# Patient Record
Sex: FEMALE | Race: WHITE | NOT HISPANIC OR LATINO | URBAN - NONMETROPOLITAN AREA
[De-identification: names, ages, dates, MRNs, and addresses within clinical notes are randomized per-mention and may not be internally consistent; named-entity substitution may affect disease eponyms.]

---

## 2018-12-13 ENCOUNTER — OPTICAL OFFICE (OUTPATIENT)
Dept: URBAN - NONMETROPOLITAN AREA CLINIC 4 | Facility: CLINIC | Age: 38
Setting detail: OPHTHALMOLOGY
End: 2018-12-13

## 2018-12-13 ENCOUNTER — RX ONLY (RX ONLY)
Age: 38
End: 2018-12-13

## 2018-12-13 ENCOUNTER — DOCTOR'S OFFICE (OUTPATIENT)
Dept: URBAN - NONMETROPOLITAN AREA CLINIC 1 | Facility: CLINIC | Age: 38
Setting detail: OPHTHALMOLOGY
End: 2018-12-13
Payer: COMMERCIAL

## 2018-12-13 DIAGNOSIS — Z01.00: ICD-10-CM

## 2018-12-13 DIAGNOSIS — H52.13: ICD-10-CM

## 2018-12-13 PROCEDURE — V2020 VISION SVCS FRAMES PURCHASES: HCPCS | Performed by: OPTOMETRIST

## 2018-12-13 PROCEDURE — 92310 CONTACT LENS FITTING OU: CPT | Performed by: OPTOMETRIST

## 2018-12-13 PROCEDURE — V2103 SPHEROCYLINDR 4.00D/12-2.00D: HCPCS | Performed by: OPTOMETRIST

## 2018-12-13 PROCEDURE — 92004 COMPRE OPH EXAM NEW PT 1/>: CPT | Performed by: OPTOMETRIST

## 2018-12-13 PROCEDURE — V2100 LENS SPHER SINGLE PLANO 4.00: HCPCS | Performed by: OPTOMETRIST

## 2018-12-13 ASSESSMENT — VISUAL ACUITY
OD_BCVA: 20/25
OS_BCVA: 20/25

## 2018-12-13 ASSESSMENT — REFRACTION_MANIFEST
OD_AXIS: 170
OS_CYLINDER: SPH
OD_VA1: 20/20
OD_VA2: 20/
OD_VA3: 20/
OU_VA: 20/20
OS_VA1: 20/20
OD_CYLINDER: -0.25
OS_VA3: 20/
OS_VA3: 20/
OU_VA: 20/
OD_VA3: 20/
OS_VA1: 20/
OS_VA2: 20/
OS_VA2: 20/
OS_SPHERE: -2.00
OD_VA2: 20/
OD_SPHERE: -1.00
OD_VA1: 20/

## 2018-12-13 ASSESSMENT — SPHEQUIV_DERIVED
OD_SPHEQUIV: -1.125
OD_SPHEQUIV: -1.125

## 2018-12-13 ASSESSMENT — REFRACTION_AUTOREFRACTION
OD_CYLINDER: -0.25
OD_SPHERE: -1.00
OS_CYLINDER: SPH
OS_SPHERE: -2.00
OD_AXIS: 017

## 2018-12-13 ASSESSMENT — REFRACTION_CURRENTRX
OS_SPHERE: -1.75
OS_OVR_VA: 20/
OD_OVR_VA: 20/
OS_OVR_VA: 20/
OD_OVR_VA: 20/
OD_OVR_VA: 20/
OD_SPHERE: -1.00
OS_OVR_VA: 20/

## 2018-12-13 ASSESSMENT — CONFRONTATIONAL VISUAL FIELD TEST (CVF)
OD_FINDINGS: FULL
OS_FINDINGS: FULL

## 2019-01-25 ENCOUNTER — OPTICAL OFFICE (OUTPATIENT)
Dept: URBAN - NONMETROPOLITAN AREA CLINIC 4 | Facility: CLINIC | Age: 39
Setting detail: OPHTHALMOLOGY
End: 2019-01-25
Payer: COMMERCIAL

## 2019-01-25 DIAGNOSIS — H52.13: ICD-10-CM

## 2019-01-25 PROCEDURE — S0500 DISPOS CONT LENS: HCPCS | Performed by: OPTOMETRIST

## 2022-07-03 ENCOUNTER — APPOINTMENT (EMERGENCY)
Dept: CT IMAGING | Facility: HOSPITAL | Age: 42
End: 2022-07-03
Payer: COMMERCIAL

## 2022-07-03 ENCOUNTER — HOSPITAL ENCOUNTER (EMERGENCY)
Facility: HOSPITAL | Age: 42
Discharge: HOME | End: 2022-07-03
Attending: STUDENT IN AN ORGANIZED HEALTH CARE EDUCATION/TRAINING PROGRAM | Admitting: STUDENT IN AN ORGANIZED HEALTH CARE EDUCATION/TRAINING PROGRAM
Payer: COMMERCIAL

## 2022-07-03 VITALS
OXYGEN SATURATION: 98 % | RESPIRATION RATE: 16 BRPM | HEIGHT: 62 IN | WEIGHT: 205 LBS | HEART RATE: 74 BPM | BODY MASS INDEX: 37.73 KG/M2 | SYSTOLIC BLOOD PRESSURE: 126 MMHG | TEMPERATURE: 98 F | DIASTOLIC BLOOD PRESSURE: 68 MMHG

## 2022-07-03 DIAGNOSIS — R10.9 RIGHT SIDED ABDOMINAL PAIN: Primary | ICD-10-CM

## 2022-07-03 LAB
ALBUMIN SERPL BCP-MCNC: 3.8 G/DL (ref 3.5–5)
ALP SERPL-CCNC: 74 U/L (ref 46–116)
ALT SERPL W P-5'-P-CCNC: 37 U/L (ref 12–78)
ANION GAP SERPL CALCULATED.3IONS-SCNC: 8 MMOL/L (ref 4–13)
AST SERPL W P-5'-P-CCNC: 21 U/L (ref 5–45)
BACTERIA UR QL AUTO: ABNORMAL /HPF
BASOPHILS # BLD AUTO: 0.05 THOUSANDS/ΜL (ref 0–0.1)
BASOPHILS NFR BLD AUTO: 1 % (ref 0–1)
BILIRUB SERPL-MCNC: 0.37 MG/DL (ref 0.2–1)
BILIRUB UR QL STRIP: NEGATIVE
BUN SERPL-MCNC: 11 MG/DL (ref 5–25)
CALCIUM SERPL-MCNC: 8.8 MG/DL (ref 8.3–10.1)
CHLORIDE SERPL-SCNC: 104 MMOL/L (ref 100–108)
CLARITY UR: CLEAR
CO2 SERPL-SCNC: 26 MMOL/L (ref 21–32)
COLOR UR: YELLOW
CREAT SERPL-MCNC: 0.73 MG/DL (ref 0.6–1.3)
EOSINOPHIL # BLD AUTO: 0.24 THOUSAND/ΜL (ref 0–0.61)
EOSINOPHIL NFR BLD AUTO: 3 % (ref 0–6)
ERYTHROCYTE [DISTWIDTH] IN BLOOD BY AUTOMATED COUNT: 12.4 % (ref 11.6–15.1)
EXT PREG TEST URINE: NEGATIVE
EXT. CONTROL ED NAV: NORMAL
GFR SERPL CREATININE-BSD FRML MDRD: 100 ML/MIN/1.73SQ M
GLUCOSE SERPL-MCNC: 93 MG/DL (ref 65–140)
GLUCOSE UR STRIP-MCNC: NEGATIVE MG/DL
HCT VFR BLD AUTO: 41.2 % (ref 34.8–46.1)
HGB BLD-MCNC: 13.7 G/DL (ref 11.5–15.4)
HGB UR QL STRIP.AUTO: ABNORMAL
IMM GRANULOCYTES # BLD AUTO: 0.03 THOUSAND/UL (ref 0–0.2)
IMM GRANULOCYTES NFR BLD AUTO: 0 % (ref 0–2)
KETONES UR STRIP-MCNC: ABNORMAL MG/DL
LEUKOCYTE ESTERASE UR QL STRIP: NEGATIVE
LIPASE SERPL-CCNC: 135 U/L (ref 73–393)
LYMPHOCYTES # BLD AUTO: 2.24 THOUSANDS/ΜL (ref 0.6–4.47)
LYMPHOCYTES NFR BLD AUTO: 31 % (ref 14–44)
MCH RBC QN AUTO: 30.2 PG (ref 26.8–34.3)
MCHC RBC AUTO-ENTMCNC: 33.3 G/DL (ref 31.4–37.4)
MCV RBC AUTO: 91 FL (ref 82–98)
MONOCYTES # BLD AUTO: 0.53 THOUSAND/ΜL (ref 0.17–1.22)
MONOCYTES NFR BLD AUTO: 7 % (ref 4–12)
MUCOUS THREADS UR QL AUTO: ABNORMAL
NEUTROPHILS # BLD AUTO: 4.18 THOUSANDS/ΜL (ref 1.85–7.62)
NEUTS SEG NFR BLD AUTO: 58 % (ref 43–75)
NITRITE UR QL STRIP: NEGATIVE
NON-SQ EPI CELLS URNS QL MICRO: ABNORMAL /HPF
NRBC BLD AUTO-RTO: 0 /100 WBCS
OTHER STN SPEC: ABNORMAL
PH UR STRIP.AUTO: 6.5 [PH]
PLATELET # BLD AUTO: 344 THOUSANDS/UL (ref 149–390)
PMV BLD AUTO: 8.5 FL (ref 8.9–12.7)
POTASSIUM SERPL-SCNC: 4 MMOL/L (ref 3.5–5.3)
PROT SERPL-MCNC: 7.3 G/DL (ref 6.4–8.2)
PROT UR STRIP-MCNC: NEGATIVE MG/DL
RBC # BLD AUTO: 4.53 MILLION/UL (ref 3.81–5.12)
RBC #/AREA URNS AUTO: ABNORMAL /HPF
SODIUM SERPL-SCNC: 138 MMOL/L (ref 136–145)
SP GR UR STRIP.AUTO: 1.02 (ref 1–1.03)
UROBILINOGEN UR QL STRIP.AUTO: 0.2 E.U./DL
WBC # BLD AUTO: 7.27 THOUSAND/UL (ref 4.31–10.16)
WBC #/AREA URNS AUTO: ABNORMAL /HPF

## 2022-07-03 PROCEDURE — G1004 CDSM NDSC: HCPCS

## 2022-07-03 PROCEDURE — 96361 HYDRATE IV INFUSION ADD-ON: CPT

## 2022-07-03 PROCEDURE — 74176 CT ABD & PELVIS W/O CONTRAST: CPT

## 2022-07-03 PROCEDURE — 96375 TX/PRO/DX INJ NEW DRUG ADDON: CPT

## 2022-07-03 PROCEDURE — 81025 URINE PREGNANCY TEST: CPT | Performed by: STUDENT IN AN ORGANIZED HEALTH CARE EDUCATION/TRAINING PROGRAM

## 2022-07-03 PROCEDURE — 81001 URINALYSIS AUTO W/SCOPE: CPT | Performed by: STUDENT IN AN ORGANIZED HEALTH CARE EDUCATION/TRAINING PROGRAM

## 2022-07-03 PROCEDURE — 85025 COMPLETE CBC W/AUTO DIFF WBC: CPT | Performed by: STUDENT IN AN ORGANIZED HEALTH CARE EDUCATION/TRAINING PROGRAM

## 2022-07-03 PROCEDURE — 83690 ASSAY OF LIPASE: CPT | Performed by: STUDENT IN AN ORGANIZED HEALTH CARE EDUCATION/TRAINING PROGRAM

## 2022-07-03 PROCEDURE — 96374 THER/PROPH/DIAG INJ IV PUSH: CPT

## 2022-07-03 PROCEDURE — 99284 EMERGENCY DEPT VISIT MOD MDM: CPT

## 2022-07-03 PROCEDURE — 99284 EMERGENCY DEPT VISIT MOD MDM: CPT | Performed by: STUDENT IN AN ORGANIZED HEALTH CARE EDUCATION/TRAINING PROGRAM

## 2022-07-03 PROCEDURE — 80053 COMPREHEN METABOLIC PANEL: CPT | Performed by: STUDENT IN AN ORGANIZED HEALTH CARE EDUCATION/TRAINING PROGRAM

## 2022-07-03 PROCEDURE — 36415 COLL VENOUS BLD VENIPUNCTURE: CPT | Performed by: STUDENT IN AN ORGANIZED HEALTH CARE EDUCATION/TRAINING PROGRAM

## 2022-07-03 RX ORDER — ONDANSETRON 2 MG/ML
4 INJECTION INTRAMUSCULAR; INTRAVENOUS ONCE
Status: COMPLETED | OUTPATIENT
Start: 2022-07-03 | End: 2022-07-03

## 2022-07-03 RX ORDER — FLUTICASONE PROPIONATE 50 MCG
2 SPRAY, SUSPENSION (ML) NASAL DAILY
COMMUNITY
Start: 2021-10-31 | End: 2022-10-31

## 2022-07-03 RX ORDER — FERROUS SULFATE 325(65) MG
TABLET ORAL
COMMUNITY

## 2022-07-03 RX ORDER — NYSTATIN 100000 U/G
1 CREAM TOPICAL 2 TIMES DAILY
COMMUNITY
Start: 2022-03-18 | End: 2023-03-18

## 2022-07-03 RX ORDER — HYDROXYZINE HYDROCHLORIDE 10 MG/1
1 TABLET, FILM COATED ORAL 3 TIMES DAILY PRN
COMMUNITY
Start: 2022-05-17

## 2022-07-03 RX ORDER — SIMVASTATIN 40 MG
TABLET ORAL
COMMUNITY
Start: 2022-01-19

## 2022-07-03 RX ORDER — LEVOTHYROXINE SODIUM 0.03 MG/1
TABLET ORAL
COMMUNITY

## 2022-07-03 RX ORDER — KETOROLAC TROMETHAMINE 30 MG/ML
15 INJECTION, SOLUTION INTRAMUSCULAR; INTRAVENOUS ONCE
Status: COMPLETED | OUTPATIENT
Start: 2022-07-03 | End: 2022-07-03

## 2022-07-03 RX ORDER — DEXLANSOPRAZOLE 60 MG/1
60 CAPSULE, DELAYED RELEASE ORAL DAILY
COMMUNITY
Start: 2022-03-18 | End: 2023-03-18

## 2022-07-03 RX ORDER — LISINOPRIL 10 MG/1
TABLET ORAL
COMMUNITY

## 2022-07-03 RX ADMIN — SODIUM CHLORIDE 1000 ML: 0.9 INJECTION, SOLUTION INTRAVENOUS at 13:07

## 2022-07-03 RX ADMIN — ONDANSETRON 4 MG: 2 INJECTION INTRAMUSCULAR; INTRAVENOUS at 13:08

## 2022-07-03 RX ADMIN — KETOROLAC TROMETHAMINE 15 MG: 30 INJECTION, SOLUTION INTRAMUSCULAR at 13:08

## 2022-07-03 NOTE — ED PROVIDER NOTES
History  Chief Complaint   Patient presents with    Abdominal Pain     To the upper right to the groin area       History provided by:  Patient  Abdominal Pain  Pain location:  RUQ (R lateral pain)  Pain quality: aching    Pain radiates to:  Does not radiate  Pain severity:  Severe  Onset quality:  Gradual  Duration:  5 days  Timing:  Constant  Progression:  Worsening  Chronicity:  New  Context: not eating, not retching and not suspicious food intake    Ineffective treatments:  None tried  Associated symptoms: nausea    Associated symptoms: no chest pain, no chills, no cough, no diarrhea, no dysuria, no fatigue, no fever, no hematuria, no melena, no shortness of breath, no sore throat and no vomiting       68-year-old female  Presents to the emergency department with right upper quadrant/right lateral abdominal pain x5 days  Standing improved her pain while lying on her right side exacerbates it  Has not taken anything for pain  Expresses mild nausea without vomiting/diarrhea  Denies fever/chills  Describes her pain as achy in nature and 8/10 severity  Food does not affect her pain  Denies melena  Of note, the patient had an abdominal ultrasound performed on  which did not show any significant findings  Prior to Admission Medications   Prescriptions Last Dose Informant Patient Reported? Taking?    Multiple Vitamin (Multi-Vitamin) tablet   Yes No   Sig: Take 1 tablet by mouth daily   cyanocobalamin (VITAMIN B-12) 100 MCG tablet   Yes No   Sig: Take by mouth   dexlansoprazole (DEXILANT) 60 MG capsule   Yes Yes   Sig: Take 60 mg by mouth daily   ferrous sulfate 325 (65 Fe) mg tablet   Yes No   Sig: Take by mouth   fluticasone (FLONASE) 50 mcg/act nasal spray   Yes Yes   Si sprays into each nostril daily   hydrOXYzine HCL (ATARAX) 10 mg tablet   Yes Yes   Sig: Take 1 tablet by mouth 3 (three) times a day as needed   levothyroxine 25 mcg tablet   Yes No   lisinopril (ZESTRIL) 10 mg tablet   Yes No nystatin (MYCOSTATIN) cream   Yes Yes   Sig: Apply 1 application topically 2 (two) times a day   simvastatin (ZOCOR) 40 mg tablet   Yes Yes   Sig: Take by mouth      Facility-Administered Medications: None       Past Medical History:   Diagnosis Date    Disease of thyroid gland     Diverticulosis     GERD (gastroesophageal reflux disease)     High cholesterol     Hypertension      History reviewed  No pertinent surgical history  History reviewed  No pertinent family history  I have reviewed and agree with the history as documented  E-Cigarette/Vaping    E-Cigarette Use Never User      E-Cigarette/Vaping Substances     Social History     Tobacco Use    Smoking status: Never Smoker    Smokeless tobacco: Never Used   Vaping Use    Vaping Use: Never used   Substance Use Topics    Alcohol use: Not Currently    Drug use: Not Currently     Review of Systems   Constitutional: Negative for activity change, appetite change, chills, diaphoresis, fatigue and fever  HENT: Negative for congestion, rhinorrhea, sinus pressure, sinus pain and sore throat  Eyes: Negative for pain and visual disturbance  Respiratory: Negative for cough, chest tightness, shortness of breath and wheezing  Cardiovascular: Negative for chest pain, palpitations and leg swelling  Gastrointestinal: Positive for abdominal pain and nausea  Negative for diarrhea, melena and vomiting  Genitourinary: Negative for decreased urine volume, difficulty urinating, dysuria, flank pain, frequency, hematuria and urgency  Musculoskeletal: Negative for back pain, myalgias and neck pain  Skin: Negative for color change, rash and wound  Neurological: Negative for dizziness, syncope, weakness, light-headedness, numbness and headaches  Hematological: Does not bruise/bleed easily  Psychiatric/Behavioral: Negative for confusion and sleep disturbance  All other systems reviewed and are negative        Physical Exam  Physical Exam  Vitals and nursing note reviewed  Constitutional:       General: She is not in acute distress  Appearance: She is not ill-appearing or toxic-appearing  HENT:      Head: Normocephalic and atraumatic  Right Ear: External ear normal       Left Ear: External ear normal       Nose: No congestion or rhinorrhea  Mouth/Throat:      Mouth: Mucous membranes are moist       Pharynx: Oropharynx is clear  No oropharyngeal exudate or posterior oropharyngeal erythema  Eyes:      General:         Right eye: No discharge  Left eye: No discharge  Extraocular Movements: Extraocular movements intact  Conjunctiva/sclera: Conjunctivae normal    Cardiovascular:      Rate and Rhythm: Normal rate and regular rhythm  Pulses: Normal pulses  Heart sounds: Normal heart sounds  No murmur heard  Pulmonary:      Effort: Pulmonary effort is normal  No respiratory distress  Breath sounds: Normal breath sounds  No stridor  No wheezing, rhonchi or rales  Chest:      Chest wall: No tenderness  Abdominal:      General: Abdomen is flat  Bowel sounds are normal  There is no distension  Palpations: Abdomen is soft  There is no mass  Tenderness: There is abdominal tenderness  There is no right CVA tenderness, left CVA tenderness, guarding or rebound  Negative signs include Acharya's sign  Hernia: No hernia is present  Musculoskeletal:         General: No swelling, tenderness, deformity or signs of injury  Cervical back: Neck supple  No tenderness  Right lower leg: No edema  Left lower leg: No edema  Skin:     General: Skin is warm and dry  Capillary Refill: Capillary refill takes less than 2 seconds  Coloration: Skin is not jaundiced or pale  Findings: No bruising, erythema or rash  Neurological:      General: No focal deficit present  Mental Status: She is alert and oriented to person, place, and time        Cranial Nerves: No cranial nerve deficit  Sensory: No sensory deficit  Motor: No weakness  Psychiatric:         Mood and Affect: Mood normal  Mood is not anxious or depressed  Behavior: Behavior normal          Thought Content:  Thought content normal          Judgment: Judgment normal        Vital Signs  ED Triage Vitals [07/03/22 1243]   Temperature Pulse Respirations Blood Pressure SpO2   97 9 °F (36 6 °C) 93 16 150/76 99 %      Temp Source Heart Rate Source Patient Position - Orthostatic VS BP Location FiO2 (%)   Temporal -- -- Left arm --      Pain Score       6           Vitals:    07/03/22 1243 07/03/22 1405   BP: 150/76 126/68   Pulse: 93 74     ED Medications  Medications   sodium chloride 0 9 % bolus 1,000 mL (0 mL Intravenous Stopped 7/3/22 1406)   ondansetron (ZOFRAN) injection 4 mg (4 mg Intravenous Given 7/3/22 1308)   ketorolac (TORADOL) injection 15 mg (15 mg Intravenous Given 7/3/22 1308)     Diagnostic Studies  Results Reviewed     Procedure Component Value Units Date/Time    Lipase [[id number]]  (Normal) Collected: 07/03/22 1310    Lab Status: Final result Specimen: Blood from Arm, Right Updated: 07/03/22 1340     Lipase 135 u/L     Comprehensive metabolic panel [[id number]] Collected: 07/03/22 1310    Lab Status: Final result Specimen: Blood from Arm, Right Updated: 07/03/22 1340     Sodium 138 mmol/L      Potassium 4 0 mmol/L      Chloride 104 mmol/L      CO2 26 mmol/L      ANION GAP 8 mmol/L      BUN 11 mg/dL      Creatinine 0 73 mg/dL      Glucose 93 mg/dL      Calcium 8 8 mg/dL      AST 21 U/L      ALT 37 U/L      Alkaline Phosphatase 74 U/L      Total Protein 7 3 g/dL      Albumin 3 8 g/dL      Total Bilirubin 0 37 mg/dL      eGFR 100 ml/min/1 73sq m     Narrative:      [place] guidelines for Chronic Kidney Disease (CKD):     Stage 1 with normal or high GFR (GFR > 90 mL/min/1 73 square meters)    Stage 2 Mild CKD (GFR = 60-89 mL/min/1 73 square meters)    Stage 3A Moderate CKD (GFR = 45-59 mL/min/1 73 square meters)    Stage 3B Moderate CKD (GFR = 30-44 mL/min/1 73 square meters)    Stage 4 Severe CKD (GFR = 15-29 mL/min/1 73 square meters)    Stage 5 End Stage CKD (GFR <15 mL/min/1 73 square meters)  Note: GFR calculation is accurate only with a steady state creatinine    Urine Microscopic [[id number]]  (Abnormal) Collected: 07/03/22 1312    Lab Status: Final result Specimen: Urine, Clean Catch Updated: 07/03/22 1335     RBC, UA 0-1 /hpf      WBC, UA 0-1 /hpf      Epithelial Cells Occasional /hpf      Bacteria, UA Occasional /hpf      OTHER OBSERVATIONS Yeast Cells Present     MUCUS THREADS Occasional    UA w Reflex to Microscopic w Reflex to Culture [[id number]]  (Abnormal) Collected: 07/03/22 1312    Lab Status: Final result Specimen: Urine, Clean Catch Updated: 07/03/22 1324     Color, UA Yellow     Clarity, UA Clear     Specific Gravity, UA 1 025     pH, UA 6 5     Leukocytes, UA Negative     Nitrite, UA Negative     Protein, UA Negative mg/dl      Glucose, UA Negative mg/dl      Ketones, UA Trace mg/dl      Urobilinogen, UA 0 2 E U /dl      Bilirubin, UA Negative     Occult Blood, UA Small    CBC and differential [[id number]]  (Abnormal) Collected: 07/03/22 1310    Lab Status: Final result Specimen: Blood from Arm, Right Updated: 07/03/22 1314     WBC 7 27 Thousand/uL      RBC 4 53 Million/uL      Hemoglobin 13 7 g/dL      Hematocrit 41 2 %      MCV 91 fL      MCH 30 2 pg      MCHC 33 3 g/dL      RDW 12 4 %      MPV 8 5 fL      Platelets 764 Thousands/uL      nRBC 0 /100 WBCs      Neutrophils Relative 58 %      Immat GRANS % 0 %      Lymphocytes Relative 31 %      Monocytes Relative 7 %      Eosinophils Relative 3 %      Basophils Relative 1 %      Neutrophils Absolute 4 18 Thousands/µL      Immature Grans Absolute 0 03 Thousand/uL      Lymphocytes Absolute 2 24 Thousands/µL      Monocytes Absolute 0 53 Thousand/µL      Eosinophils Absolute 0 24 Thousand/µL Basophils Absolute 0 05 Thousands/µL     POCT pregnancy, urine [[id number]]  (Normal) Resulted: 07/03/22 1314    Lab Status: Final result Updated: 07/03/22 1314     EXT PREG TEST UR (Ref: Negative) negative     Control valid                 CT abdomen pelvis wo contrast   Final Result by Norma Ceballos MD (07/03 1352)         1  No acute abnormality identified in the abdomen or pelvis  2   Mild diverticulosis without evidence for acute diverticulitis  3   Additional nonemergent findings as noted  Workstation performed: [id number]                Procedures  Procedures     ED Course       SBIRT 20yo+    Flowsheet Row Most Recent Value   SBIRT (25 yo +)    In order to provide better care to our patients, we are screening all of our patients for alcohol and drug use  Would it be okay to ask you these screening questions? Yes Filed at: 07/03/2022 1248   Initial Alcohol Screen: US AUDIT-C     1  How often do you have a drink containing alcohol? 0 Filed at: 07/03/2022 1248   2  How many drinks containing alcohol do you have on a typical day you are drinking? 0 Filed at: 07/03/2022 1248   3a  Male UNDER 65: How often do you have five or more drinks on one occasion? 0 Filed at: 07/03/2022 1248   3b  FEMALE Any Age, or MALE 65+: How often do you have 4 or more drinks on one occassion? 0 Filed at: 07/03/2022 1248   Audit-C Score 0 Filed at: 07/03/2022 1248   POOJA: How many times in the past year have you    Used an illegal drug or used a prescription medication for non-medical reasons? Never Filed at: 07/03/2022 1248          MDM     47-year-old female  Presents to the emergency department with right-sided abdominal pain  Not associated with food  Expressed mild nausea without vomiting/diarrhea  Exam significant for tenderness to palpation along the right lateral abdomen  Negative Acharya sign  No significant laboratory/imaging abnormalities    The patient recently had a right upper quadrant ultrasound which was also negative for acute findings  Conservative measures were discussed  PCP follow-up recommended  The patient was stable for discharge  Disposition  Final diagnoses:   Right sided abdominal pain     Time reflects when diagnosis was documented in both MDM as applicable and the Disposition within this note     Time User Action Codes Description Comment    7/3/2022  1:56 PM Adriana Handy Add [R10 9] Right sided abdominal pain       ED Disposition     ED Disposition   Discharge    Condition   Stable    Date/Time   Sun Jul 3, 2022  1:56 PM    [facility] discharge to home/self care  Follow-up Information    None         Discharge Medication List as of 7/3/2022  1:57 PM      CONTINUE these medications which have NOT CHANGED    Details   dexlansoprazole (DEXILANT) 60 MG capsule Take 60 mg by mouth daily, Starting Fri 3/18/2022, Until Sat 3/18/2023, Historical Med      fluticasone (FLONASE) 50 mcg/act nasal spray 2 sprays into each nostril daily, Starting Sun 10/31/2021, Until Mon 10/31/2022, Historical Med      hydrOXYzine HCL (ATARAX) 10 mg tablet Take 1 tablet by mouth 3 (three) times a day as needed, Starting Tue 5/17/2022, Historical Med      nystatin (MYCOSTATIN) cream Apply 1 application topically 2 (two) times a day, Starting Fri 3/18/2022, Until Sat 3/18/2023, Historical Med      simvastatin (ZOCOR) 40 mg tablet Take by mouth, Starting Wed 1/19/2022, Historical Med      cyanocobalamin (VITAMIN B-12) 100 MCG tablet Take by mouth, Historical Med      ferrous sulfate 325 (65 Fe) mg tablet Take by mouth, Historical Med      levothyroxine 25 mcg tablet Historical Med      lisinopril (ZESTRIL) 10 mg tablet Historical Med      Multiple Vitamin (Multi-Vitamin) tablet Take 1 tablet by mouth daily, Historical Med             No discharge procedures on file      PDMP Review     None          ED Provider  Electronically Signed by           Sheng Lui DO  07/03/22 1417

## 2022-07-03 NOTE — DISCHARGE INSTRUCTIONS
You were evaluated in the emergency department for abdominal pain  The laboratory and imaging studies that were obtained did not show any significant abnormalities  For pain, you can take Motrin 600 mg every 6 hours and Tylenol 1000 mg every 6 hours  Follow-up with your primary care provider  Do not hesitate to be re-evaluated in the ED for any concerning signs or symptoms

## 2023-05-21 ENCOUNTER — HOSPITAL ENCOUNTER (EMERGENCY)
Facility: HOSPITAL | Age: 43
Discharge: HOME | End: 2023-05-21
Attending: EMERGENCY MEDICINE

## 2023-05-21 ENCOUNTER — APPOINTMENT (EMERGENCY)
Dept: CT IMAGING | Facility: HOSPITAL | Age: 43
End: 2023-05-21

## 2023-05-21 VITALS
SYSTOLIC BLOOD PRESSURE: 128 MMHG | OXYGEN SATURATION: 98 % | WEIGHT: 198 LBS | RESPIRATION RATE: 16 BRPM | HEART RATE: 84 BPM | HEIGHT: 63 IN | DIASTOLIC BLOOD PRESSURE: 74 MMHG | BODY MASS INDEX: 35.08 KG/M2 | TEMPERATURE: 97.5 F

## 2023-05-21 DIAGNOSIS — R22.1 NECK SWELLING: Primary | ICD-10-CM

## 2023-05-21 DIAGNOSIS — E04.1 THYROID CYST: ICD-10-CM

## 2023-05-21 LAB
ALBUMIN SERPL BCP-MCNC: 4.5 G/DL (ref 3.5–5)
ALP SERPL-CCNC: 68 U/L (ref 34–104)
ALT SERPL W P-5'-P-CCNC: 21 U/L (ref 7–52)
ANION GAP SERPL CALCULATED.3IONS-SCNC: 7 MMOL/L (ref 4–13)
AST SERPL W P-5'-P-CCNC: 22 U/L (ref 13–39)
BASOPHILS # BLD AUTO: 0.07 THOUSANDS/ÂΜL (ref 0–0.1)
BASOPHILS NFR BLD AUTO: 1 % (ref 0–1)
BILIRUB SERPL-MCNC: 0.51 MG/DL (ref 0.2–1)
BUN SERPL-MCNC: 9 MG/DL (ref 5–25)
CALCIUM SERPL-MCNC: 10.1 MG/DL (ref 8.4–10.2)
CHLORIDE SERPL-SCNC: 104 MMOL/L (ref 96–108)
CO2 SERPL-SCNC: 26 MMOL/L (ref 21–32)
CREAT SERPL-MCNC: 0.74 MG/DL (ref 0.6–1.3)
EOSINOPHIL # BLD AUTO: 0.08 THOUSAND/ÂΜL (ref 0–0.61)
EOSINOPHIL NFR BLD AUTO: 1 % (ref 0–6)
ERYTHROCYTE [DISTWIDTH] IN BLOOD BY AUTOMATED COUNT: 12.7 % (ref 11.6–15.1)
EXT PREGNANCY TEST URINE: NEGATIVE
EXT. CONTROL: NORMAL
GFR SERPL CREATININE-BSD FRML MDRD: 98 ML/MIN/1.73SQ M
GLUCOSE SERPL-MCNC: 102 MG/DL (ref 65–140)
HCT VFR BLD AUTO: 43.4 % (ref 34.8–46.1)
HGB BLD-MCNC: 14.1 G/DL (ref 11.5–15.4)
IMM GRANULOCYTES # BLD AUTO: 0.03 THOUSAND/UL (ref 0–0.2)
IMM GRANULOCYTES NFR BLD AUTO: 0 % (ref 0–2)
LYMPHOCYTES # BLD AUTO: 2.38 THOUSANDS/ÂΜL (ref 0.6–4.47)
LYMPHOCYTES NFR BLD AUTO: 28 % (ref 14–44)
MCH RBC QN AUTO: 29.5 PG (ref 26.8–34.3)
MCHC RBC AUTO-ENTMCNC: 32.5 G/DL (ref 31.4–37.4)
MCV RBC AUTO: 91 FL (ref 82–98)
MONOCYTES # BLD AUTO: 0.63 THOUSAND/ÂΜL (ref 0.17–1.22)
MONOCYTES NFR BLD AUTO: 7 % (ref 4–12)
NEUTROPHILS # BLD AUTO: 5.36 THOUSANDS/ÂΜL (ref 1.85–7.62)
NEUTS SEG NFR BLD AUTO: 63 % (ref 43–75)
NRBC BLD AUTO-RTO: 0 /100 WBCS
PLATELET # BLD AUTO: 386 THOUSANDS/UL (ref 149–390)
PMV BLD AUTO: 9 FL (ref 8.9–12.7)
POTASSIUM SERPL-SCNC: 4.1 MMOL/L (ref 3.5–5.3)
PROT SERPL-MCNC: 7.8 G/DL (ref 6.4–8.4)
RBC # BLD AUTO: 4.78 MILLION/UL (ref 3.81–5.12)
SODIUM SERPL-SCNC: 137 MMOL/L (ref 135–147)
WBC # BLD AUTO: 8.55 THOUSAND/UL (ref 4.31–10.16)

## 2023-05-21 RX ORDER — IBUPROFEN 400 MG/1
400 TABLET ORAL ONCE
Status: COMPLETED | OUTPATIENT
Start: 2023-05-21 | End: 2023-05-21

## 2023-05-21 RX ADMIN — IBUPROFEN 400 MG: 400 TABLET, FILM COATED ORAL at 12:09

## 2023-05-21 RX ADMIN — IOHEXOL 100 ML: 350 INJECTION, SOLUTION INTRAVENOUS at 12:07

## 2023-05-21 NOTE — DISCHARGE INSTRUCTIONS
Please follow-up with your PCP regarding your thyroid nodules and your ultrasound results  Your CT neck shows thyroid nodules but there were no other acute findings  You can take motrin and/or Tylenol for pain control

## 2023-05-21 NOTE — ED PROVIDER NOTES
History  Chief Complaint   Patient presents with   • Medical Problem     Pt arrives reporting swelling to both sides of her neck increasing over past week  Pt had US of neck Friday and was told she has cysts on thyroid and will repeat scan in 6month  Pt now reporting swelling and pain to right arm  HPI   43F w hx of HTN presenting with neck swelling  She had an ultrasound on 5/19 which showed bilateral thyroid cysts  Since then, she has felt that her neck has become more swollen and she is worried  She has PCP appt next Tuesday to talk about her results  Today, she also felt her right armpit region was more swollen than her left armpit region when she looked in the mirror  She has not taken anything for pain  Prior to Admission Medications   Prescriptions Last Dose Informant Patient Reported? Taking? Multiple Vitamin (Multi-Vitamin) tablet   Yes Yes   Sig: Take 1 tablet by mouth daily   cyanocobalamin (VITAMIN B-12) 100 MCG tablet   Yes Yes   Sig: Take by mouth   dexlansoprazole (DEXILANT) 60 MG capsule   Yes Yes   Sig: Take 60 mg by mouth daily   ferrous sulfate 325 (65 Fe) mg tablet Not Taking  Yes No   Sig: Take by mouth   Patient not taking: Reported on 5/21/2023   levothyroxine 25 mcg tablet   Yes Yes   lisinopril (ZESTRIL) 10 mg tablet   Yes Yes   simvastatin (ZOCOR) 40 mg tablet   Yes Yes   Sig: Take by mouth      Facility-Administered Medications: None       Past Medical History:   Diagnosis Date   • Disease of thyroid gland    • Diverticulosis    • GERD (gastroesophageal reflux disease)    • High cholesterol    • Hypertension        History reviewed  No pertinent surgical history  History reviewed  No pertinent family history  I have reviewed and agree with the history as documented      E-Cigarette/Vaping   • E-Cigarette Use Never User      E-Cigarette/Vaping Substances     Social History     Tobacco Use   • Smoking status: Never   • Smokeless tobacco: Never   Vaping Use   • Vaping Use: Never used   Substance Use Topics   • Alcohol use: Yes   • Drug use: Not Currently       Review of Systems   Constitutional: Negative for chills and fever  HENT: Negative for ear pain and sore throat  Eyes: Negative for pain and visual disturbance  Respiratory: Negative for cough and shortness of breath  Cardiovascular: Negative for chest pain and palpitations  Gastrointestinal: Negative for abdominal pain and vomiting  Genitourinary: Negative for dysuria and hematuria  Musculoskeletal: Positive for neck pain  Negative for arthralgias and back pain  Skin: Negative for color change and rash  Neurological: Negative for seizures and syncope  All other systems reviewed and are negative  Physical Exam  Physical Exam  Vitals and nursing note reviewed  Constitutional:       General: She is not in acute distress  Appearance: She is well-developed  HENT:      Head: Normocephalic and atraumatic  Right Ear: External ear normal       Left Ear: External ear normal       Nose: Nose normal       Mouth/Throat:      Pharynx: Oropharynx is clear  Uvula midline  No pharyngeal swelling or oropharyngeal exudate  Comments: Floor of mouth is soft  Eyes:      Conjunctiva/sclera: Conjunctivae normal    Neck:      Comments: Tenderness over region of bilateral thyroid glands  Cardiovascular:      Rate and Rhythm: Normal rate and regular rhythm  Pulmonary:      Effort: Pulmonary effort is normal  No respiratory distress  Breath sounds: Normal breath sounds  Abdominal:      Palpations: Abdomen is soft  Tenderness: There is no abdominal tenderness  Musculoskeletal:      Cervical back: Normal range of motion and neck supple  Tenderness present  No rigidity  Comments: No abscesses or lymph nodes of right axilla region that was palpated by me  I do not see any obvious swelling of right axilla/chest region  Lymphadenopathy:      Cervical: No cervical adenopathy     Skin: General: Skin is warm and dry  Neurological:      General: No focal deficit present  Mental Status: She is alert  Mental status is at baseline           Vital Signs  ED Triage Vitals   Temperature Pulse Respirations Blood Pressure SpO2   05/21/23 1101 05/21/23 1101 05/21/23 1101 05/21/23 1101 05/21/23 1101   97 5 °F (36 4 °C) 103 18 167/89 100 %      Temp src Heart Rate Source Patient Position - Orthostatic VS BP Location FiO2 (%)   -- 05/21/23 1200 -- -- --    Monitor         Pain Score       05/21/23 1101       8           Vitals:    05/21/23 1101 05/21/23 1200   BP: 167/89 132/71   Pulse: 103 88         Visual Acuity      ED Medications  Medications   ibuprofen (MOTRIN) tablet 400 mg (400 mg Oral Given 5/21/23 1209)   iohexol (OMNIPAQUE) 350 MG/ML injection (SINGLE-DOSE) 100 mL (100 mL Intravenous Given 5/21/23 1207)       Diagnostic Studies  Results Reviewed     Procedure Component Value Units Date/Time    POCT pregnancy, urine [[id number]]  (Normal) Resulted: 05/21/23 1154    Lab Status: Final result Updated: 05/21/23 1154     EXT Preg Test, Ur Negative     Control Valid    Comprehensive metabolic panel [[id number]] Collected: 05/21/23 1109    Lab Status: Final result Specimen: Blood from Arm, Right Updated: 05/21/23 1140     Sodium 137 mmol/L      Potassium 4 1 mmol/L      Chloride 104 mmol/L      CO2 26 mmol/L      ANION GAP 7 mmol/L      BUN 9 mg/dL      Creatinine 0 74 mg/dL      Glucose 102 mg/dL      Calcium 10 1 mg/dL      AST 22 U/L      ALT 21 U/L      Alkaline Phosphatase 68 U/L      Total Protein 7 8 g/dL      Albumin 4 5 g/dL      Total Bilirubin 0 51 mg/dL      eGFR 98 ml/min/1 73sq m     Narrative:      [place] guidelines for Chronic Kidney Disease (CKD):   •  Stage 1 with normal or high GFR (GFR > 90 mL/min/1 73 square meters)  •  Stage 2 Mild CKD (GFR = 60-89 mL/min/1 73 square meters)  •  Stage 3A Moderate CKD (GFR = 45-59 mL/min/1 73 square meters)  •  Stage 3B Moderate CKD (GFR = 30-44 mL/min/1 73 square meters)  •  Stage 4 Severe CKD (GFR = 15-29 mL/min/1 73 square meters)  •  Stage 5 End Stage CKD (GFR <15 mL/min/1 73 square meters)  Note: GFR calculation is accurate only with a steady state creatinine    CBC and differential [[id number]] Collected: 05/21/23 1109    Lab Status: Final result Specimen: Blood from Arm, Right Updated: 05/21/23 1115     WBC 8 55 Thousand/uL      RBC 4 78 Million/uL      Hemoglobin 14 1 g/dL      Hematocrit 43 4 %      MCV 91 fL      MCH 29 5 pg      MCHC 32 5 g/dL      RDW 12 7 %      MPV 9 0 fL      Platelets 300 Thousands/uL      nRBC 0 /100 WBCs      Neutrophils Relative 63 %      Immat GRANS % 0 %      Lymphocytes Relative 28 %      Monocytes Relative 7 %      Eosinophils Relative 1 %      Basophils Relative 1 %      Neutrophils Absolute 5 36 Thousands/µL      Immature Grans Absolute 0 03 Thousand/uL      Lymphocytes Absolute 2 38 Thousands/µL      Monocytes Absolute 0 63 Thousand/µL      Eosinophils Absolute 0 08 Thousand/µL      Basophils Absolute 0 07 Thousands/µL                CT soft tissue neck with contrast   Final Result by Sin Contreras DO (05/21 1222)      No mass or pathologic adenopathy  Workstation performed: [id number]                Procedures  Procedures     ED Course  ED Course as of 05/21/23 1239   Sun May 21, 2023   1116 WBC: 8 55   1116 Hemoglobin: 14 1   1224 CT Neck  IMPRESSION:  No mass or pathologic adenopathy  Medical Decision Making  43F presenting with neck swelling/pain and swelling to right axilla  Recent thyroid ultrasound on 5/19 which was significant for thyroid cysts  Presents today because she feels like her neck is more swollen and right axilla is swollen  On exam, patient has tenderness over lower neck region where the thyroid glands are located  No neck stiffness or meningeal signs  Oropharynx is clear   She complains of right axilla swelling, however I do not feel any fluctuance, masses, or visualize obvious swelling  CT neck obtained to eval for the neck swelling and eval for causes such as odontogenic infection, abscesses, epiglottitis, airway patency  Labwork reassuring with normal CBC, CMP  CT neck w/o acute pathologies  Patient given motrin for pain  I discussed results with patient  She feels reassured by the CT findings  Discussed follow-up with her PCP; she has appt in 2 days  Discharged in stable condition  Amount and/or Complexity of Data Reviewed  Labs: ordered  Decision-making details documented in ED Course  Radiology: ordered  Risk  OTC drugs  Disposition  Final diagnoses:   Neck swelling   Thyroid cyst     Time reflects when diagnosis was documented in both MDM as applicable and the Disposition within this note     Time User Action Codes Description Comment    5/21/2023 12:32 PM Moo Kirks Add [R22 1] Neck swelling     5/21/2023 12:32 PM Moo Ribeiro Remove [R22 1] Neck swelling     5/21/2023 12:32 PM Moo Kirks Add [R22 1] Neck swelling     5/21/2023 12:33 PM Moo Ribeiro Add [E04 1] Thyroid cyst       ED Disposition     ED Disposition   Discharge    Condition   Stable    Date/Time   Sun May 21, 2023 12:32 PM    Comment   Adonay Luke discharge to home/self care  Follow-up Information    None         Patient's Medications   Discharge Prescriptions    No medications on file       No discharge procedures on file      PDMP Review     None          ED Provider  Electronically Signed by           Cali Goodrich MD  05/21/23 0999

## 2023-12-02 ENCOUNTER — APPOINTMENT (EMERGENCY)
Dept: CT IMAGING | Facility: HOSPITAL | Age: 43
DRG: 694 | End: 2023-12-02
Payer: COMMERCIAL

## 2023-12-02 ENCOUNTER — HOSPITAL ENCOUNTER (INPATIENT)
Facility: HOSPITAL | Age: 43
LOS: 1 days | Discharge: HOME | DRG: 694 | End: 2023-12-03
Attending: EMERGENCY MEDICINE | Admitting: FAMILY MEDICINE
Payer: COMMERCIAL

## 2023-12-02 DIAGNOSIS — R10.9 LEFT FLANK PAIN: Primary | ICD-10-CM

## 2023-12-02 DIAGNOSIS — D72.829 LEUKOCYTOSIS: ICD-10-CM

## 2023-12-02 DIAGNOSIS — R52 UNCONTROLLED PAIN: ICD-10-CM

## 2023-12-02 PROBLEM — E87.6 HYPOKALEMIA: Status: ACTIVE | Noted: 2023-12-02

## 2023-12-02 PROBLEM — E03.9 ACQUIRED HYPOTHYROIDISM: Status: ACTIVE | Noted: 2023-12-02

## 2023-12-02 PROBLEM — R10.32 LEFT LOWER QUADRANT ABDOMINAL PAIN: Status: ACTIVE | Noted: 2023-12-02

## 2023-12-02 PROBLEM — R65.10 SEVERE SYSTEMIC INFLAMMATORY RESPONSE SYNDROME (SIRS) (HCC): Status: ACTIVE | Noted: 2023-12-02

## 2023-12-02 PROBLEM — I10 PRIMARY HYPERTENSION: Status: ACTIVE | Noted: 2023-12-02

## 2023-12-02 LAB
ALBUMIN SERPL BCP-MCNC: 4.6 G/DL (ref 3.5–5)
ALP SERPL-CCNC: 66 U/L (ref 34–104)
ALT SERPL W P-5'-P-CCNC: 23 U/L (ref 7–52)
ANION GAP SERPL CALCULATED.3IONS-SCNC: 14 MMOL/L
AST SERPL W P-5'-P-CCNC: 22 U/L (ref 13–39)
BACTERIA UR QL AUTO: ABNORMAL /HPF
BASOPHILS # BLD AUTO: 0.1 THOUSANDS/ÂΜL (ref 0–0.1)
BASOPHILS NFR BLD AUTO: 1 % (ref 0–1)
BILIRUB SERPL-MCNC: 0.56 MG/DL (ref 0.2–1)
BILIRUB UR QL STRIP: NEGATIVE
BUN SERPL-MCNC: 11 MG/DL (ref 5–25)
CALCIUM SERPL-MCNC: 9.7 MG/DL (ref 8.4–10.2)
CHLORIDE SERPL-SCNC: 101 MMOL/L (ref 96–108)
CLARITY UR: ABNORMAL
CO2 SERPL-SCNC: 21 MMOL/L (ref 21–32)
COLOR UR: YELLOW
CREAT SERPL-MCNC: 0.72 MG/DL (ref 0.6–1.3)
EOSINOPHIL # BLD AUTO: 0.12 THOUSAND/ÂΜL (ref 0–0.61)
EOSINOPHIL NFR BLD AUTO: 1 % (ref 0–6)
ERYTHROCYTE [DISTWIDTH] IN BLOOD BY AUTOMATED COUNT: 12.6 % (ref 11.6–15.1)
EXT PREGNANCY TEST URINE: NEGATIVE
EXT. CONTROL: NORMAL
GFR SERPL CREATININE-BSD FRML MDRD: 101 ML/MIN/1.73SQ M
GLUCOSE SERPL-MCNC: 106 MG/DL (ref 65–140)
GLUCOSE UR STRIP-MCNC: NEGATIVE MG/DL
HCT VFR BLD AUTO: 44 % (ref 34.8–46.1)
HGB BLD-MCNC: 14.6 G/DL (ref 11.5–15.4)
HGB UR QL STRIP.AUTO: ABNORMAL
IMM GRANULOCYTES # BLD AUTO: 0.07 THOUSAND/UL (ref 0–0.2)
IMM GRANULOCYTES NFR BLD AUTO: 1 % (ref 0–2)
KETONES UR STRIP-MCNC: ABNORMAL MG/DL
LACTATE SERPL-SCNC: 1 MMOL/L (ref 0.5–2)
LACTATE SERPL-SCNC: 3.9 MMOL/L (ref 0.5–2)
LEUKOCYTE ESTERASE UR QL STRIP: NEGATIVE
LYMPHOCYTES # BLD AUTO: 5.04 THOUSANDS/ÂΜL (ref 0.6–4.47)
LYMPHOCYTES NFR BLD AUTO: 40 % (ref 14–44)
MCH RBC QN AUTO: 29.1 PG (ref 26.8–34.3)
MCHC RBC AUTO-ENTMCNC: 33.2 G/DL (ref 31.4–37.4)
MCV RBC AUTO: 88 FL (ref 82–98)
MONOCYTES # BLD AUTO: 0.92 THOUSAND/ÂΜL (ref 0.17–1.22)
MONOCYTES NFR BLD AUTO: 7 % (ref 4–12)
MUCOUS THREADS UR QL AUTO: ABNORMAL
NEUTROPHILS # BLD AUTO: 6.29 THOUSANDS/ÂΜL (ref 1.85–7.62)
NEUTS SEG NFR BLD AUTO: 50 % (ref 43–75)
NITRITE UR QL STRIP: NEGATIVE
NON-SQ EPI CELLS URNS QL MICRO: ABNORMAL /HPF
NRBC BLD AUTO-RTO: 0 /100 WBCS
PH UR STRIP.AUTO: 6.5 [PH]
PLATELET # BLD AUTO: 448 THOUSANDS/UL (ref 149–390)
PMV BLD AUTO: 9.1 FL (ref 8.9–12.7)
POTASSIUM SERPL-SCNC: 3.1 MMOL/L (ref 3.5–5.3)
PROT SERPL-MCNC: 7.7 G/DL (ref 6.4–8.4)
PROT UR STRIP-MCNC: ABNORMAL MG/DL
RBC # BLD AUTO: 5.02 MILLION/UL (ref 3.81–5.12)
RBC #/AREA URNS AUTO: ABNORMAL /HPF
SODIUM SERPL-SCNC: 136 MMOL/L (ref 135–147)
SP GR UR STRIP.AUTO: >=1.03 (ref 1–1.03)
UROBILINOGEN UR QL STRIP.AUTO: 0.2 E.U./DL
WBC # BLD AUTO: 12.54 THOUSAND/UL (ref 4.31–10.16)
WBC #/AREA URNS AUTO: ABNORMAL /HPF

## 2023-12-02 PROCEDURE — 83605 ASSAY OF LACTIC ACID: CPT | Performed by: PHYSICIAN ASSISTANT

## 2023-12-02 PROCEDURE — 96375 TX/PRO/DX INJ NEW DRUG ADDON: CPT

## 2023-12-02 PROCEDURE — 80053 COMPREHEN METABOLIC PANEL: CPT | Performed by: PHYSICIAN ASSISTANT

## 2023-12-02 PROCEDURE — 96376 TX/PRO/DX INJ SAME DRUG ADON: CPT

## 2023-12-02 PROCEDURE — 81025 URINE PREGNANCY TEST: CPT | Performed by: PHYSICIAN ASSISTANT

## 2023-12-02 PROCEDURE — G1004 CDSM NDSC: HCPCS

## 2023-12-02 PROCEDURE — 74177 CT ABD & PELVIS W/CONTRAST: CPT

## 2023-12-02 PROCEDURE — 36415 COLL VENOUS BLD VENIPUNCTURE: CPT | Performed by: PHYSICIAN ASSISTANT

## 2023-12-02 PROCEDURE — 99223 1ST HOSP IP/OBS HIGH 75: CPT | Performed by: NURSE PRACTITIONER

## 2023-12-02 PROCEDURE — 85025 COMPLETE CBC W/AUTO DIFF WBC: CPT | Performed by: PHYSICIAN ASSISTANT

## 2023-12-02 PROCEDURE — 81001 URINALYSIS AUTO W/SCOPE: CPT | Performed by: PHYSICIAN ASSISTANT

## 2023-12-02 PROCEDURE — 99284 EMERGENCY DEPT VISIT MOD MDM: CPT

## 2023-12-02 PROCEDURE — 99285 EMERGENCY DEPT VISIT HI MDM: CPT | Performed by: PHYSICIAN ASSISTANT

## 2023-12-02 PROCEDURE — 96361 HYDRATE IV INFUSION ADD-ON: CPT

## 2023-12-02 PROCEDURE — 74176 CT ABD & PELVIS W/O CONTRAST: CPT

## 2023-12-02 PROCEDURE — 96374 THER/PROPH/DIAG INJ IV PUSH: CPT

## 2023-12-02 PROCEDURE — 87086 URINE CULTURE/COLONY COUNT: CPT | Performed by: NURSE PRACTITIONER

## 2023-12-02 RX ORDER — FENTANYL CITRATE 50 UG/ML
100 INJECTION, SOLUTION INTRAMUSCULAR; INTRAVENOUS ONCE
Status: COMPLETED | OUTPATIENT
Start: 2023-12-02 | End: 2023-12-02

## 2023-12-02 RX ORDER — BETAMETHASONE DIPROPIONATE 0.5 MG/G
1 CREAM TOPICAL 2 TIMES DAILY
COMMUNITY
Start: 2023-08-29 | End: 2024-08-28

## 2023-12-02 RX ORDER — KETOROLAC TROMETHAMINE 30 MG/ML
15 INJECTION, SOLUTION INTRAMUSCULAR; INTRAVENOUS ONCE
Status: COMPLETED | OUTPATIENT
Start: 2023-12-02 | End: 2023-12-02

## 2023-12-02 RX ORDER — LISINOPRIL 20 MG/1
20 TABLET ORAL DAILY
Status: DISCONTINUED | OUTPATIENT
Start: 2023-12-03 | End: 2023-12-03 | Stop reason: HOSPADM

## 2023-12-02 RX ORDER — ONDANSETRON 2 MG/ML
4 INJECTION INTRAMUSCULAR; INTRAVENOUS ONCE
Status: COMPLETED | OUTPATIENT
Start: 2023-12-02 | End: 2023-12-02

## 2023-12-02 RX ORDER — FENTANYL CITRATE 50 UG/ML
50 INJECTION, SOLUTION INTRAMUSCULAR; INTRAVENOUS ONCE
Status: COMPLETED | OUTPATIENT
Start: 2023-12-02 | End: 2023-12-02

## 2023-12-02 RX ORDER — NYSTATIN 100000 [USP'U]/G
POWDER TOPICAL 2 TIMES DAILY
COMMUNITY
Start: 2023-08-28 | End: 2024-08-27

## 2023-12-02 RX ORDER — CEFTRIAXONE 1 G/50ML
1000 INJECTION, SOLUTION INTRAVENOUS EVERY 24 HOURS
Status: DISCONTINUED | OUTPATIENT
Start: 2023-12-02 | End: 2023-12-03 | Stop reason: HOSPADM

## 2023-12-02 RX ORDER — LEVOTHYROXINE SODIUM 0.03 MG/1
25 TABLET ORAL
Status: DISCONTINUED | OUTPATIENT
Start: 2023-12-03 | End: 2023-12-03 | Stop reason: HOSPADM

## 2023-12-02 RX ORDER — CALCIUM POLYCARBOPHIL 625 MG
625 TABLET ORAL DAILY
COMMUNITY

## 2023-12-02 RX ORDER — ACETAMINOPHEN 325 MG/1
650 TABLET ORAL EVERY 6 HOURS PRN
Status: DISCONTINUED | OUTPATIENT
Start: 2023-12-02 | End: 2023-12-03 | Stop reason: HOSPADM

## 2023-12-02 RX ORDER — FENTANYL CITRATE 50 UG/ML
25 INJECTION, SOLUTION INTRAMUSCULAR; INTRAVENOUS ONCE
Status: COMPLETED | OUTPATIENT
Start: 2023-12-02 | End: 2023-12-02

## 2023-12-02 RX ORDER — ZOLPIDEM TARTRATE 5 MG/1
10 TABLET ORAL
Status: DISCONTINUED | OUTPATIENT
Start: 2023-12-02 | End: 2023-12-03 | Stop reason: HOSPADM

## 2023-12-02 RX ORDER — AMPICILLIN TRIHYDRATE 250 MG
500 CAPSULE ORAL 2 TIMES DAILY
COMMUNITY

## 2023-12-02 RX ORDER — ZOLPIDEM TARTRATE 10 MG/1
10 TABLET ORAL DAILY PRN
COMMUNITY
Start: 2023-10-10 | End: 2024-01-08

## 2023-12-02 RX ORDER — PANTOPRAZOLE SODIUM 40 MG/1
40 TABLET, DELAYED RELEASE ORAL
Status: DISCONTINUED | OUTPATIENT
Start: 2023-12-03 | End: 2023-12-03 | Stop reason: HOSPADM

## 2023-12-02 RX ORDER — CLOTRIMAZOLE 1 %
1 CREAM (GRAM) TOPICAL 2 TIMES DAILY
COMMUNITY
Start: 2023-08-29 | End: 2024-08-28

## 2023-12-02 RX ADMIN — IOHEXOL 100 ML: 350 INJECTION, SOLUTION INTRAVENOUS at 16:05

## 2023-12-02 RX ADMIN — SODIUM CHLORIDE 1000 ML: 0.9 INJECTION, SOLUTION INTRAVENOUS at 13:15

## 2023-12-02 RX ADMIN — ONDANSETRON 4 MG: 2 INJECTION INTRAMUSCULAR; INTRAVENOUS at 13:14

## 2023-12-02 RX ADMIN — ZOLPIDEM TARTRATE 10 MG: 5 TABLET, FILM COATED ORAL at 20:14

## 2023-12-02 RX ADMIN — FENTANYL CITRATE 100 MCG: 50 INJECTION INTRAMUSCULAR; INTRAVENOUS at 15:28

## 2023-12-02 RX ADMIN — CEFTRIAXONE 1000 MG: 1 INJECTION, SOLUTION INTRAVENOUS at 21:36

## 2023-12-02 RX ADMIN — FENTANYL CITRATE 50 MCG: 50 INJECTION INTRAMUSCULAR; INTRAVENOUS at 13:50

## 2023-12-02 RX ADMIN — FENTANYL CITRATE 25 MCG: 50 INJECTION INTRAMUSCULAR; INTRAVENOUS at 14:43

## 2023-12-02 RX ADMIN — KETOROLAC TROMETHAMINE 15 MG: 30 INJECTION, SOLUTION INTRAMUSCULAR; INTRAVENOUS at 13:14

## 2023-12-02 NOTE — ED PROVIDER NOTES
History  Chief Complaint   Patient presents with    Groin Pain     Pt states left flank pain over the last few days, now has pain in groin. Hx kidney stones as a teenager. 37year old female presents to the ED for evaluation of left flank pain. Patient states 2 days ago she was awoke from her sleep with left lower back pain. Patient states this pain has been intermittent since and has since radiated into the left lower abdomen and into the left groin. Reports pain comes and goes. She denies fevers or chills. Mild nausea without vomiting. No diarrhea or constipation. She denies urinary symptoms. She reports she does have a history of kidney stones as a teenager, states she does not know if this feels similar. Does have history of diverticulosis. She denies any vaginal or pelvic pain. Denies any abnormal vaginal discharge lesions or itch. Prior to Admission Medications   Prescriptions Last Dose Informant Patient Reported? Taking? Calcium Polycarbophil (Fiber) 625 MG TABS 12/2/2023  Yes Yes   Sig: Take 625 mg by mouth daily   Cinnamon 500 MG capsule 12/2/2023  Yes Yes   Sig: Take 500 mg by mouth 2 (two) times a day   Multiple Vitamin (Multi-Vitamin) tablet 12/2/2023  Yes Yes   Sig: Take 1 tablet by mouth daily   ascorbic acid (VITAMIN C) 1000 MG tablet 12/2/2023  Yes Yes   Sig: Take 1,000 mg by mouth daily   betamethasone dipropionate (DIPROSONE) 0.05 % cream Past Month  Yes Yes   Sig: Apply 1 application. topically 2 (two) times a day   clotrimazole (LOTRIMIN) 1 % cream Past Week  Yes Yes   Sig: Apply 1 application.  topically 2 (two) times a day   cyanocobalamin (VITAMIN B-12) 100 MCG tablet 12/2/2023  Yes Yes   Sig: Take by mouth   dexlansoprazole (DEXILANT) 60 MG capsule 12/1/2023  Yes Yes   Sig: Take 60 mg by mouth daily   ferrous sulfate 325 (65 Fe) mg tablet 12/1/2023  Yes Yes   Sig: Take by mouth   levothyroxine 25 mcg tablet 12/2/2023  Yes Yes   lisinopril (ZESTRIL) 10 mg tablet 12/2/2023  Yes Yes   nystatin (MYCOSTATIN) powder Past Month  Yes Yes   Sig: Apply topically 2 (two) times a day   simvastatin (ZOCOR) 40 mg tablet 12/2/2023  Yes Yes   Sig: Take by mouth   zolpidem (AMBIEN) 10 mg tablet 12/1/2023  Yes Yes   Sig: Take 10 mg by mouth daily as needed      Facility-Administered Medications: None       Past Medical History:   Diagnosis Date    Disease of thyroid gland     Diverticulosis     GERD (gastroesophageal reflux disease)     High cholesterol     Hypertension        History reviewed. No pertinent surgical history. History reviewed. No pertinent family history. I have reviewed and agree with the history as documented. E-Cigarette/Vaping    E-Cigarette Use Never User      E-Cigarette/Vaping Substances     Social History     Tobacco Use    Smoking status: Never    Smokeless tobacco: Never   Vaping Use    Vaping Use: Never used   Substance Use Topics    Alcohol use: Yes    Drug use: Not Currently       Review of Systems   Constitutional:  Positive for appetite change (decreased). Negative for fatigue and fever. Respiratory: Negative. Cardiovascular: Negative. Gastrointestinal:  Positive for abdominal pain and nausea. Negative for constipation, diarrhea and vomiting. Genitourinary:  Positive for frequency. Musculoskeletal:  Positive for back pain. Skin: Negative. Neurological: Negative. All other systems reviewed and are negative. Physical Exam  Physical Exam  Vitals and nursing note reviewed. Constitutional:       Appearance: Normal appearance. Comments: + uncomfortable      HENT:      Nose: Nose normal.   Eyes:      Conjunctiva/sclera: Conjunctivae normal.   Cardiovascular:      Rate and Rhythm: Regular rhythm. Tachycardia present. Pulmonary:      Effort: Pulmonary effort is normal.      Breath sounds: Normal breath sounds. No stridor. No wheezing, rhonchi or rales. Chest:      Chest wall: No tenderness. Abdominal:      General: There is no distension. Palpations: Abdomen is soft. Tenderness: There is abdominal tenderness (LLQ pain). There is left CVA tenderness. Musculoskeletal:      Comments: Difficulty lifting the left leg off the bed due to pain in the left groin/left lower abdomen. No tenderness over the left hip. Skin:     General: Skin is warm and dry. Findings: No bruising, erythema or rash. Neurological:      General: No focal deficit present. Mental Status: She is alert. Psychiatric:         Mood and Affect: Mood is anxious.          Vital Signs  ED Triage Vitals   Temperature Pulse Respirations Blood Pressure SpO2   12/02/23 1243 12/02/23 1243 12/02/23 1243 12/02/23 1243 12/02/23 1243   98.8 °F (37.1 °C) (!) 138 18 150/91 100 %      Temp Source Heart Rate Source Patient Position - Orthostatic VS BP Location FiO2 (%)   12/02/23 1243 12/02/23 1243 12/02/23 1243 12/02/23 1243 --   Temporal Monitor Sitting Right arm       Pain Score       12/02/23 1314       10 - Worst Possible Pain           Vitals:    12/02/23 1245 12/02/23 1502 12/02/23 1652 12/02/23 1832   BP: 150/91 142/94 129/75 130/86   Pulse: (!) 139 (!) 112 103 94   Patient Position - Orthostatic VS:   Lying          Visual Acuity      ED Medications  Medications   ketorolac (TORADOL) injection 15 mg (15 mg Intravenous Given 12/2/23 1314)   sodium chloride 0.9 % bolus 1,000 mL (0 mL Intravenous Stopped 12/2/23 1415)   ondansetron (ZOFRAN) injection 4 mg (4 mg Intravenous Given 12/2/23 1314)   fentanyl citrate (PF) 100 MCG/2ML 50 mcg (50 mcg Intravenous Given 12/2/23 1350)   fentanyl citrate (PF) 100 MCG/2ML 25 mcg (25 mcg Intravenous Given 12/2/23 1443)   fentanyl citrate (PF) 100 MCG/2ML 100 mcg (100 mcg Intravenous Given 12/2/23 1528)   iohexol (OMNIPAQUE) 350 MG/ML injection (SINGLE-DOSE) 100 mL (100 mL Intravenous Given 12/2/23 1605)       Diagnostic Studies  Results Reviewed       Procedure Component Value Units Date/Time    Lactic acid 2 Hours [[id number]]  (Normal) Collected: 12/02/23 1727    Lab Status: Final result Specimen: Blood from Arm, Left Updated: 12/02/23 1753     LACTIC ACID 1.0 mmol/L     Narrative:      Result may be elevated if tourniquet was used during collection. Urine Microscopic [[id number]]  (Abnormal) Collected: 12/02/23 1317    Lab Status: Final result Specimen: Urine, Clean Catch Updated: 12/02/23 1349     RBC, UA 10-20 /hpf      WBC, UA None Seen /hpf      Epithelial Cells Moderate /hpf      Bacteria, UA Moderate /hpf      MUCUS THREADS Innumerable    Lactic acid, plasma (w/reflex if result > 2.0) [[id number]]  (Abnormal) Collected: 12/02/23 1311    Lab Status: Final result Specimen: Blood from Arm, Left Updated: 12/02/23 1345     LACTIC ACID 3.9 mmol/L     Narrative:      Result may be elevated if tourniquet was used during collection.     Comprehensive metabolic panel [[id number]]  (Abnormal) Collected: 12/02/23 1311    Lab Status: Final result Specimen: Blood from Arm, Left Updated: 12/02/23 1339     Sodium 136 mmol/L      Potassium 3.1 mmol/L      Chloride 101 mmol/L      CO2 21 mmol/L      ANION GAP 14 mmol/L      BUN 11 mg/dL      Creatinine 0.72 mg/dL      Glucose 106 mg/dL      Calcium 9.7 mg/dL      AST 22 U/L      ALT 23 U/L      Alkaline Phosphatase 66 U/L      Total Protein 7.7 g/dL      Albumin 4.6 g/dL      Total Bilirubin 0.56 mg/dL      eGFR 101 ml/min/1.73sq m     Narrative:      [place] guidelines for Chronic Kidney Disease (CKD):     Stage 1 with normal or high GFR (GFR > 90 mL/min/1.73 square meters)    Stage 2 Mild CKD (GFR = 60-89 mL/min/1.73 square meters)    Stage 3A Moderate CKD (GFR = 45-59 mL/min/1.73 square meters)    Stage 3B Moderate CKD (GFR = 30-44 mL/min/1.73 square meters)    Stage 4 Severe CKD (GFR = 15-29 mL/min/1.73 square meters)    Stage 5 End Stage CKD (GFR <15 mL/min/1.73 square meters)  Note: GFR calculation is accurate only with a steady state creatinine    UA w Reflex to Microscopic w Reflex to Culture [[id number]]  (Abnormal) Collected: 12/02/23 1317    Lab Status: Final result Specimen: Urine, Clean Catch Updated: 12/02/23 1327     Color, UA Yellow     Clarity, UA Slightly Cloudy     Specific Gravity, UA >=1.030     pH, UA 6.5     Leukocytes, UA Negative     Nitrite, UA Negative     Protein,  (2+) mg/dl      Glucose, UA Negative mg/dl      Ketones, UA 40 (2+) mg/dl      Urobilinogen, UA 0.2 E.U./dl      Bilirubin, UA Negative     Occult Blood, UA Trace-Intact    CBC and differential [[id number]]  (Abnormal) Collected: 12/02/23 1311    Lab Status: Final result Specimen: Blood from Arm, Left Updated: 12/02/23 1320     WBC 12.54 Thousand/uL      RBC 5.02 Million/uL      Hemoglobin 14.6 g/dL      Hematocrit 44.0 %      MCV 88 fL      MCH 29.1 pg      MCHC 33.2 g/dL      RDW 12.6 %      MPV 9.1 fL      Platelets 998 Thousands/uL      nRBC 0 /100 WBCs      Neutrophils Relative 50 %      Immat GRANS % 1 %      Lymphocytes Relative 40 %      Monocytes Relative 7 %      Eosinophils Relative 1 %      Basophils Relative 1 %      Neutrophils Absolute 6.29 Thousands/µL      Immature Grans Absolute 0.07 Thousand/uL      Lymphocytes Absolute 5.04 Thousands/µL      Monocytes Absolute 0.92 Thousand/µL      Eosinophils Absolute 0.12 Thousand/µL      Basophils Absolute 0.10 Thousands/µL     POCT pregnancy, urine [[id number]]  (Normal) Resulted: 12/02/23 1318    Lab Status: Final result Updated: 12/02/23 1318     EXT Preg Test, Ur Negative     Control Valid                   CT abdomen pelvis with contrast   Final Result by [address] [place] [id number],  (12/02 1725)      No acute intra-abdominal abnormality. No free air, free fluid, mesenteric inflammatory process, or bowel wall thickening. Workstation performed: [id number]         CT renal stone study abdomen pelvis wo contrast   Final Result by Neel Kessler MD (12/02 1443)      No acute pathology.   Specifically, no urinary tract calculi or obstructive uropathy. Workstation performed: [id number]                    Procedures  Procedures         ED Course  ED Course as of 12/02/23 1842   Sat Dec 02, 2023   1320 PREGNANCY TEST URINE: Negative   1323 WBC(!): 12.54   1328 Blood, UA(!): Trace-Intact  UA negative for UTI   1345 LACTIC ACID(!!): 3.9   1345 Patient still in significant pain. Will trial fentanyl. Discussed results and findings at this time. [id number] Patient still continues in significant discomfort. Will give additional dose of fentanyl   1445 CT renal stone: No acute pathology. Specifically, no urinary tract calculi or obstructive uropathy. 1501 Patient reports pain is very minimally improved. Still 10/10   1503 Patient still calling out in pain   1606 Patient's pain now somewhat improved    1722 Patient reports mild improvement of pain if she is sitting still    1726 CT abd: No acute intra-abdominal abnormality. No free air, free fluid, mesenteric inflammatory process, or bowel wall thickening. 1806 Patient continues with significant discomfort. Discussed with medicine who accepted the patient for admission                               SBIRT 22yo+      Flowsheet Row Most Recent Value   Initial Alcohol Screen: US AUDIT-C     1. How often do you have a drink containing alcohol? 0 Filed at: 12/02/2023 1244   2. How many drinks containing alcohol do you have on a typical day you are drinking? 0 Filed at: 12/02/2023 1244   3b. FEMALE Any Age, or MALE 65+: How often do you have 4 or more drinks on one occassion? 0 Filed at: 12/02/2023 1244   Audit-C Score 0 Filed at: 12/02/2023 1244   POOJA: How many times in the past year have you. .. Used an illegal drug or used a prescription medication for non-medical reasons? Never Filed at: 12/02/2023 1244                      Medical Decision Making  77-year-old female presents to the emergency department for evaluation of left flank/left lower quadrant/left groin pain.   Vitals and medical record reviewed. Patient received the following but not limited to kidney stone, recently passed kidney stone, UTI, pyelonephritis, diverticulitis. Her laboratory findings were noted for leukocytosis and lactic acidosis. Lactic acid improved with fluids. CT scans were unremarkable for any acute abnormalities. Had difficulty controlling the patient's pain but she continued to be significantly uncomfortable in the emergency department. Chon Montoya did have some mild improvement. Discussed with the medicine doctor who will keep her overnight for continued evaluation and pain management. Problems Addressed:  Left flank pain: acute illness or injury  Leukocytosis: acute illness or injury  Uncontrolled pain: acute illness or injury    Amount and/or Complexity of Data Reviewed  Labs: ordered. Decision-making details documented in ED Course. Radiology: ordered. Risk  Prescription drug management. Decision regarding hospitalization. Disposition  Final diagnoses:   Left flank pain   Uncontrolled pain   Leukocytosis     Time reflects when diagnosis was documented in both MDM as applicable and the Disposition within this note       Time User Action Codes Description Comment    12/2/2023  6:06 PM Hamilton Mackenzie [R10.9] Left flank pain     12/2/2023  6:07 PM Abimbola Miles [R52] Uncontrolled pain     12/2/2023  6:07 PM Abimbola Morse Add [H94.183] Leukocytosis           ED Disposition       ED Disposition   Admit    Condition   Stable    Date/Time   Sat Dec 2, 2023 1806    Comment   Case was discussed with Dr. Mariano Myles and the patient's admission status was agreed to be Admission Status: inpatient status to the service of Dr. Mariano Myles .                Follow-up Information    None         Current Discharge Medication List        CONTINUE these medications which have NOT CHANGED    Details   ascorbic acid (VITAMIN C) 1000 MG tablet Take 1,000 mg by mouth daily      betamethasone dipropionate (DIPROSONE) 0.05 % cream Apply 1 application. topically 2 (two) times a day      Calcium Polycarbophil (Fiber) 625 MG TABS Take 625 mg by mouth daily      Cinnamon 500 MG capsule Take 500 mg by mouth 2 (two) times a day      clotrimazole (LOTRIMIN) 1 % cream Apply 1 application. topically 2 (two) times a day      cyanocobalamin (VITAMIN B-12) 100 MCG tablet Take by mouth      dexlansoprazole (DEXILANT) 60 MG capsule Take 60 mg by mouth daily      ferrous sulfate 325 (65 Fe) mg tablet Take by mouth      levothyroxine 25 mcg tablet       lisinopril (ZESTRIL) 10 mg tablet       Multiple Vitamin (Multi-Vitamin) tablet Take 1 tablet by mouth daily      nystatin (MYCOSTATIN) powder Apply topically 2 (two) times a day      simvastatin (ZOCOR) 40 mg tablet Take by mouth      zolpidem (AMBIEN) 10 mg tablet Take 10 mg by mouth daily as needed             No discharge procedures on file.     PDMP Review       None            ED Provider  Electronically Signed by             Miguel Angel Herrera PA-C  12/02/23 5232

## 2023-12-03 VITALS
SYSTOLIC BLOOD PRESSURE: 121 MMHG | BODY MASS INDEX: 37.36 KG/M2 | TEMPERATURE: 97.2 F | OXYGEN SATURATION: 94 % | WEIGHT: 203 LBS | HEART RATE: 80 BPM | RESPIRATION RATE: 18 BRPM | HEIGHT: 62 IN | DIASTOLIC BLOOD PRESSURE: 80 MMHG

## 2023-12-03 LAB
ANION GAP SERPL CALCULATED.3IONS-SCNC: 5 MMOL/L
BUN SERPL-MCNC: 12 MG/DL (ref 5–25)
CALCIUM SERPL-MCNC: 8.6 MG/DL (ref 8.4–10.2)
CHLORIDE SERPL-SCNC: 106 MMOL/L (ref 96–108)
CO2 SERPL-SCNC: 26 MMOL/L (ref 21–32)
CREAT SERPL-MCNC: 0.62 MG/DL (ref 0.6–1.3)
ERYTHROCYTE [DISTWIDTH] IN BLOOD BY AUTOMATED COUNT: 12.9 % (ref 11.6–15.1)
GFR SERPL CREATININE-BSD FRML MDRD: 109 ML/MIN/1.73SQ M
GLUCOSE SERPL-MCNC: 96 MG/DL (ref 65–140)
HCT VFR BLD AUTO: 35.7 % (ref 34.8–46.1)
HGB BLD-MCNC: 11.7 G/DL (ref 11.5–15.4)
MAGNESIUM SERPL-MCNC: 1.9 MG/DL (ref 1.9–2.7)
MCH RBC QN AUTO: 29.5 PG (ref 26.8–34.3)
MCHC RBC AUTO-ENTMCNC: 32.8 G/DL (ref 31.4–37.4)
MCV RBC AUTO: 90 FL (ref 82–98)
PLATELET # BLD AUTO: 302 THOUSANDS/UL (ref 149–390)
PMV BLD AUTO: 9.1 FL (ref 8.9–12.7)
POTASSIUM SERPL-SCNC: 3.7 MMOL/L (ref 3.5–5.3)
RBC # BLD AUTO: 3.97 MILLION/UL (ref 3.81–5.12)
SODIUM SERPL-SCNC: 137 MMOL/L (ref 135–147)
WBC # BLD AUTO: 6.7 THOUSAND/UL (ref 4.31–10.16)

## 2023-12-03 PROCEDURE — 83735 ASSAY OF MAGNESIUM: CPT | Performed by: NURSE PRACTITIONER

## 2023-12-03 PROCEDURE — 99239 HOSP IP/OBS DSCHRG MGMT >30: CPT | Performed by: FAMILY MEDICINE

## 2023-12-03 PROCEDURE — 80048 BASIC METABOLIC PNL TOTAL CA: CPT | Performed by: NURSE PRACTITIONER

## 2023-12-03 PROCEDURE — 85027 COMPLETE CBC AUTOMATED: CPT | Performed by: NURSE PRACTITIONER

## 2023-12-03 RX ORDER — CEFDINIR 300 MG/1
300 CAPSULE ORAL EVERY 12 HOURS SCHEDULED
Qty: 10 CAPSULE | Refills: 0 | Status: SHIPPED | OUTPATIENT
Start: 2023-12-03 | End: 2023-12-08

## 2023-12-03 RX ORDER — HYDROMORPHONE HCL/PF 1 MG/ML
0.5 SYRINGE (ML) INJECTION
Status: DISCONTINUED | OUTPATIENT
Start: 2023-12-03 | End: 2023-12-03 | Stop reason: HOSPADM

## 2023-12-03 RX ADMIN — LEVOTHYROXINE SODIUM 25 MCG: 25 TABLET ORAL at 04:42

## 2023-12-03 RX ADMIN — LISINOPRIL 20 MG: 20 TABLET ORAL at 08:33

## 2023-12-03 RX ADMIN — HYDROMORPHONE HYDROCHLORIDE 0.5 MG: 1 INJECTION, SOLUTION INTRAMUSCULAR; INTRAVENOUS; SUBCUTANEOUS at 02:45

## 2023-12-03 NOTE — UTILIZATION REVIEW
Initial Clinical Review    Admission: Date/Time/Statement:   Admission Orders (From admission, onward)       Ordered        12/02/23 1807  INPATIENT ADMISSION  Once                          Orders Placed This Encounter   Procedures    INPATIENT ADMISSION     Standing Status:   Standing     Number of Occurrences:   1     Order Specific Question:   Level of Care     Answer:   Med Surg [16]     Order Specific Question:   Estimated length of stay     Answer:   More than 2 Midnights     Order Specific Question:   Certification     Answer:   I certify that inpatient services are medically necessary for this patient for a duration of greater than two midnights. See H&P and MD Progress Notes for additional information about the patient's course of treatment. ED Arrival Information       Expected   -    Arrival   12/2/2023 12:38    Acuity   Urgent              Means of arrival   Walk-In    Escorted by   Family Member    Service   Hospitalist    Admission type   Emergency              Arrival complaint   groin pain for 2 days             Chief Complaint   Patient presents with    Groin Pain     Pt states left flank pain over the last few days, now has pain in groin. Hx kidney stones as a teenager. Initial Presentation: 37 y.o. female to ED from home w/ PMHX hypertension, hyperlipidemia, GERD, ISABELLE,  hypothyroidism, insomnia, interstitial cystitis  . C/o severe left lower quadrant abdominal pain affecting ability to walk. Developing left flank pain Thursday night that improved , pt went to work and it worsened and radiated to left groin Friday into Saturday developing severe pain Saturday afternoon with hematuria and urinary pressure. CT abdomen pelvis renal protocol revealing no kidney stones then CT abdomen pelvis with contrast revealing no acute abnormality. Lactic acid was 3.9 and she was tachycardic with leukocytosis improving after fluid resuscitation and pain control.  Admitted IP status w/ LLQ abd pain plan to strain all urine , pain control . K 3.1 replete and recheck . HTN cont lisinopril . Hypothyroidism synthroid . SIRS LA resolved after IVF , ua neg . Suspect inflammatory sec to passed ureteral stone . ED Triage Vitals   Temperature Pulse Respirations Blood Pressure SpO2   12/02/23 1243 12/02/23 1243 12/02/23 1243 12/02/23 1243 12/02/23 1243   98.8 °F (37.1 °C) (!) 138 18 150/91 100 %      Temp Source Heart Rate Source Patient Position - Orthostatic VS BP Location FiO2 (%)   12/02/23 1243 12/02/23 1243 12/02/23 1243 12/02/23 1243 --   Temporal Monitor Sitting Right arm       Pain Score       12/02/23 1314       10 - Worst Possible Pain          Wt Readings from Last 1 Encounters:   12/02/23 92.1 kg (203 lb)     Additional Vital Signs:   Date/Time Temp Pulse Resp BP MAP (mmHg) SpO2 O2 Device Patient Position - Orthostatic VS   12/03/23 07:17:38 97.2 °F (36.2 °C) Abnormal  80 18 121/80 94 94 % None (Room air) Lying   12/02/23 22:27:29 97.2 °F (36.2 °C) Abnormal  102 18 115/74 88 95 % None (Room air) Lying   12/02/23 1937 -- -- -- -- -- -- None (Room air) --   12/02/23 18:32:16 97.9 °F (36.6 °C) 94 16 130/86 101 94 % -- --   12/02/23 1652 -- 103 18 129/75 -- 99 % None (Room air) Lying   12/02/23 1502 98.8 °F (37.1 °C) 112 Abnormal  17 142/94 -- 100 % None (Room air) --   12/02/23 1245 -- 139 Abnormal  -- 150/91 113 100       Pertinent Labs/Diagnostic Test Results:   CT abdomen pelvis with contrast   Final Result by [address] [place] [address],  (12/02 1725)      No acute intra-abdominal abnormality. No free air, free fluid, mesenteric inflammatory process, or bowel wall thickening. Workstation performed: [id number]         CT renal stone study abdomen pelvis wo contrast   Final Result by Alexis James MD (12/02 1443)      No acute pathology. Specifically, no urinary tract calculi or obstructive uropathy.             Workstation performed: [id number]               Results from last 7 days   Lab Units 12/03/23  0438 12/02/23  1311   WBC Thousand/uL 6.70 12.54*   HEMOGLOBIN g/dL 11.7 14.6   HEMATOCRIT % 35.7 44.0   PLATELETS Thousands/uL 302 448*   NEUTROS ABS Thousands/µL  --  6.29         Results from last 7 days   Lab Units 12/03/23  0438 12/02/23  1311   SODIUM mmol/L 137 136   POTASSIUM mmol/L 3.7 3.1*   CHLORIDE mmol/L 106 101   CO2 mmol/L 26 21   ANION GAP mmol/L 5 14   BUN mg/dL 12 11   CREATININE mg/dL 0.62 0.72   EGFR ml/min/1.73sq m 109 101   CALCIUM mg/dL 8.6 9.7   MAGNESIUM mg/dL 1.9  --      Results from last 7 days   Lab Units 12/02/23  1311   AST U/L 22   ALT U/L 23   ALK PHOS U/L 66   TOTAL PROTEIN g/dL 7.7   ALBUMIN g/dL 4.6   TOTAL BILIRUBIN mg/dL 0.56         Results from last 7 days   Lab Units 12/03/23  0438 12/02/23  1311   GLUCOSE RANDOM mg/dL 96 106       Results from last 7 days   Lab Units 12/02/23  1727 12/02/23  1311   LACTIC ACID mmol/L 1.0 3.9*     Results from last 7 days   Lab Units 12/02/23  1317   CLARITY UA  Slightly Cloudy   COLOR UA  Yellow   SPEC GRAV UA  >=1.030   PH UA  6.5   GLUCOSE UA mg/dl Negative   KETONES UA mg/dl 40 (2+)*   BLOOD UA  Trace-Intact*   PROTEIN UA mg/dl 100 (2+)*   NITRITE UA  Negative   BILIRUBIN UA  Negative   UROBILINOGEN UA E.U./dl 0.2   LEUKOCYTES UA  Negative   WBC UA /hpf None Seen   RBC UA /hpf 10-20*   BACTERIA UA /hpf Moderate*   EPITHELIAL CELLS WET PREP /hpf Moderate*   MUCUS THREADS  Innumerable*           ED Treatment:   Medication Administration from 12/02/2023 1237 to 12/02/2023 1828         Date/Time Order Dose Route Action     12/02/2023 1314 EST ketorolac (TORADOL) injection 15 mg 15 mg Intravenous Given     12/02/2023 1315 EST sodium chloride 0.9 % bolus 1,000 mL 1,000 mL Intravenous New Bag     12/02/2023 1314 EST ondansetron (ZOFRAN) injection 4 mg 4 mg Intravenous Given     12/02/2023 1350 EST fentanyl citrate (PF) 100 MCG/2ML 50 mcg 50 mcg Intravenous Given     12/02/2023 1443 EST fentanyl citrate (PF) 100 MCG/2ML 25 mcg 25 mcg Intravenous Given     12/02/2023 1528 EST fentanyl citrate (PF) 100 MCG/2ML 100 mcg 100 mcg Intravenous Given          Past Medical History:   Diagnosis Date    Disease of thyroid gland     Diverticulosis     GERD (gastroesophageal reflux disease)     High cholesterol     Hypertension      Present on Admission:  **None**      Admitting Diagnosis: Leukocytosis [D72.829]  Groin pain [R10.30]  Left flank pain [R10.9]  Uncontrolled pain [R52]  Age/Sex: 37 y.o. female  Admission Orders:  Scheduled Medications:  cefTRIAXone, 1,000 mg, Intravenous, Q24H  levothyroxine, 25 mcg, Oral, Early Morning  lisinopril, 20 mg, Oral, Daily  pantoprazole, 40 mg, Oral, Daily Before Lunch      Continuous IV Infusions:     PRN Meds:  acetaminophen, 650 mg, Oral, Q6H PRN  HYDROmorphone, 0.5 mg, Intravenous, Q3H PRN  12/3 x1  zolpidem, 10 mg, Oral, HS PRN      Strain all urine   Amb pt   Reg diet   Up and OOB       Network Utilization Review Department  ATTENTION: Please call with any questions or concerns to [phone] and carefully listen to the prompts so that you are directed to the right person. All voicemails are confidential.   For Discharge needs, contact Care Management DC Support Team at [phone] opt. 2  Send all requests for admission clinical reviews, approved or denied determinations and any other requests to dedicated fax number below belonging to the campus where the patient is receiving treatment.  List of dedicated fax numbers for the Facilities:  [place] DENIALS (Administrative/Medical Necessity) [phone]   DISCHARGE SUPPORT TEAM (NETWORK) [id number] (Maternity/NICU/Pediatrics) [phone]   [address] [facility] [address] [phone]   [facility] [address] [place] [address] [address] [address] [facility] [address] [facility] [address] [facility]  [address] [phone]

## 2023-12-03 NOTE — PLAN OF CARE
Problem: PAIN - ADULT  Goal: Verbalizes/displays adequate comfort level or baseline comfort level  Description: Interventions:  - Encourage patient to monitor pain and request assistance  - Assess pain using appropriate pain scale  - Administer analgesics based on type and severity of pain and evaluate response  - Implement non-pharmacological measures as appropriate and evaluate response  - Consider cultural and social influences on pain and pain management  - Notify physician/advanced practitioner if interventions unsuccessful or patient reports new pain  12/3/2023 1112 by Marycruz Bah RN  Outcome: Adequate for Discharge  12/3/2023 0835 by Marycruz Bah RN  Outcome: Progressing     Problem: INFECTION - ADULT  Goal: Absence or prevention of progression during hospitalization  Description: INTERVENTIONS:  - Assess and monitor for signs and symptoms of infection  - Monitor lab/diagnostic results  - Monitor all insertion sites, i.e. indwelling lines, tubes, and drains  - Monitor endotracheal if appropriate and nasal secretions for changes in amount and color  - Spring appropriate cooling/warming therapies per order  - Administer medications as ordered  - Instruct and encourage patient and family to use good hand hygiene technique  - Identify and instruct in appropriate isolation precautions for identified infection/condition  12/3/2023 1112 by Marycruz Bah RN  Outcome: Adequate for Discharge  12/3/2023 0835 by Marycruz Bah RN  Outcome: Progressing  Goal: Absence of fever/infection during neutropenic period  Description: INTERVENTIONS:  - Monitor WBC    12/3/2023 1112 by Marycruz Bah RN  Outcome: Adequate for Discharge  12/3/2023 0835 by Marycruz Bah RN  Outcome: Progressing     Problem: PAIN - ADULT  Goal: Verbalizes/displays adequate comfort level or baseline comfort level  Description: Interventions:  - Encourage patient to monitor pain and request assistance  - Assess pain using appropriate pain scale  - Administer analgesics based on type and severity of pain and evaluate response  - Implement non-pharmacological measures as appropriate and evaluate response  - Consider cultural and social influences on pain and pain management  - Notify physician/advanced practitioner if interventions unsuccessful or patient reports new pain  12/3/2023 1112 by Florencio Noble RN  Outcome: Adequate for Discharge  12/3/2023 0835 by Florencio Noble RN  Outcome: Progressing     Problem: INFECTION - ADULT  Goal: Absence or prevention of progression during hospitalization  Description: INTERVENTIONS:  - Assess and monitor for signs and symptoms of infection  - Monitor lab/diagnostic results  - Monitor all insertion sites, i.e. indwelling lines, tubes, and drains  - Monitor endotracheal if appropriate and nasal secretions for changes in amount and color  - [place] appropriate cooling/warming therapies per order  - Administer medications as ordered  - Instruct and encourage patient and family to use good hand hygiene technique  - Identify and instruct in appropriate isolation precautions for identified infection/condition  12/3/2023 1112 by Florencio Noble RN  Outcome: Adequate for Discharge  12/3/2023 0835 by Florencio Noble RN  Outcome: Progressing  Goal: Absence of fever/infection during neutropenic period  Description: INTERVENTIONS:  - Monitor WBC    12/3/2023 1112 by Florencio Noble RN  Outcome: Adequate for Discharge  12/3/2023 0835 by Florencio Noble RN  Outcome: Progressing     Problem: SAFETY ADULT  Goal: Patient will remain free of falls  Description: INTERVENTIONS:  - Educate patient/family on patient safety including physical limitations  - Instruct patient to call for assistance with activity   - Consult OT/PT to assist with strengthening/mobility   - Keep Call bell within reach  - Keep bed low and locked with side rails adjusted as appropriate  - Keep care items and personal belongings within reach  - Initiate and maintain comfort rounds  - Make Fall Risk Sign visible to staff    - Apply yellow socks and bracelet for high fall risk patients  - Consider moving patient to room near nurses station  12/3/2023 1112 by Payton Smith RN  Outcome: Adequate for Discharge  12/3/2023 0835 by Payton Smith RN  Outcome: Progressing  Goal: Maintain or return to baseline ADL function  Description: INTERVENTIONS:  -  Assess patient's ability to carry out ADLs; assess patient's baseline for ADL function and identify physical deficits which impact ability to perform ADLs (bathing, care of mouth/teeth, toileting, grooming, dressing, etc.)  - Assess/evaluate cause of self-care deficits   - Assess range of motion  - Assess patient's mobility; develop plan if impaired  - Assess patient's need for assistive devices and provide as appropriate  - Encourage maximum independence but intervene and supervise when necessary  - Involve family in performance of ADLs  - Assess for home care needs following discharge   - Consider OT consult to assist with ADL evaluation and planning for discharge  - Provide patient education as appropriate  12/3/2023 1112 by Payton Smith RN  Outcome: Adequate for Discharge  12/3/2023 0835 by Payton Smith RN  Outcome: Progressing  Goal: Maintains/Returns to pre admission functional level  Description: INTERVENTIONS:  - Perform AM-PAC 6 Click Basic Mobility/ Daily Activity assessment daily.  - Set and communicate daily mobility goal to care team and patient/family/caregiver.    - Collaborate with rehabilitation services on mobility goals if consulted    - Out of bed for toileting  - Record patient progress and toleration of activity level   12/3/2023 1112 by Payton Smith RN  Outcome: Adequate for Discharge  12/3/2023 0835 by Payton Smith RN  Outcome: Progressing     Problem: DISCHARGE PLANNING  Goal: Discharge to home or other facility with appropriate resources  Description: INTERVENTIONS:  - Identify barriers to discharge w/patient and caregiver  - Arrange for needed discharge resources and transportation as appropriate  - Identify discharge learning needs (meds, wound care, etc.)  - Arrange for interpretive services to assist at discharge as needed  - Refer to Case Management Department for coordinating discharge planning if the patient needs post-hospital services based on physician/advanced practitioner order or complex needs related to functional status, cognitive ability, or social support system  12/3/2023 1112 by Marycarmen Dey RN  Outcome: Adequate for Discharge  12/3/2023 0835 by Marycarmen Dey RN  Outcome: Progressing     Problem: Knowledge Deficit  Goal: Patient/family/caregiver demonstrates understanding of disease process, treatment plan, medications, and discharge instructions  Description: Complete learning assessment and assess knowledge base.   Interventions:  - Provide teaching at level of understanding  - Provide teaching via preferred learning methods  12/3/2023 1112 by Marycarmen Dey RN  Outcome: Adequate for Discharge  12/3/2023 0835 by Marycarmen Dey RN  Outcome: Progressing     Problem: GENITOURINARY - ADULT  Goal: Maintains or returns to baseline urinary function  Description: INTERVENTIONS:  - Assess urinary function  - Encourage oral fluids to ensure adequate hydration if ordered  - Administer IV fluids as ordered to ensure adequate hydration  - Administer ordered medications as needed  - Offer frequent toileting  - Follow urinary retention protocol if ordered  12/3/2023 1112 by Marycarmen Dey RN  Outcome: Adequate for Discharge  12/3/2023 0835 by Marycarmen Dey RN  Outcome: Progressing  Goal: Absence of urinary retention  Description: INTERVENTIONS:  - Assess patient’s ability to void and empty bladder  - Monitor I/O  - Bladder scan as needed  - Discuss with physician/AP medications to alleviate retention as needed  - Discuss catheterization for long term situations as appropriate  12/3/2023 1112 by Clint Gonzalez RN  Outcome: Adequate for Discharge  12/3/2023 0835 by Clint Gonzalez RN  Outcome: Progressing

## 2023-12-03 NOTE — ASSESSMENT & PLAN NOTE
Met criteria with tachycardia, leukocytosis, lactic acid 3.9  Lactic acid resolved to 1 after fluid resuscitation  UA negative pyuria  Suspect inflammatory secondary to passed ureteral stone resolved

## 2023-12-03 NOTE — H&P
[address] [facility]  H&P  Name: Adrianne Eubanks 37 y.o. female I MRN: [id number]  Unit/Bed#: -01 I Date of Admission: 12/2/2023   Date of Service: 12/2/2023 I Hospital Day: 0      Assessment/Plan   * Left lower quadrant abdominal pain  Assessment & Plan  Presented to ED with left flank pain that began Thursday night and improved on Friday but then developed pain radiating from left flank to left abdomen with hematuria, urinary pressure and on Saturday afternoon pain was so severe she was barely able to walk  UA negative pyuria but positive hematuria/bacteriuria/epithelial cells  CT abdomen pelvis without contrast negative for stones, CT abdomen pelvis with contrast no acute abnormality  Pain controlled with Toradol and IV fentanyl  Admitted to medical service for observation, serial abdominal exams, repeat labs in the a.m. Pain-free at time of admission  Suspect probable passed left ureteral stone  Strain all urine    Hypokalemia  Assessment & Plan  Potassium 3.1  Repleted recheck  Magnesium pending    Primary hypertension  Assessment & Plan  Continue lisinopril 20 mg daily  Trend blood pressure    Acquired hypothyroidism  Assessment & Plan  Continue levothyroxine 25 mcg daily  Outpatient follow-up    Severe systemic inflammatory response syndrome (SIRS) (HCC)  Assessment & Plan  Met criteria with tachycardia, leukocytosis, lactic acid 3.9  Lactic acid resolved to 1 after fluid resuscitation  UA negative pyuria  Suspect inflammatory secondary to passed ureteral stone           VTE Pharmacologic Prophylaxis: VTE Score: 2 Low Risk (Score 0-2) - Encourage Ambulation. Code Status: Level 1 - Full Code   Discussion with family: Updated  () at bedside. Anticipated Length of Stay: Patient will be admitted on an observation basis with an anticipated length of stay of less than 2 midnights secondary to intractable abdominal pain, SIRS.     Total Time Spent on Date of Encounter in care of patient: 45 mins. This time was spent on one or more of the following: performing physical exam; counseling and coordination of care; obtaining or reviewing history; documenting in the medical record; reviewing/ordering tests, medications or procedures; communicating with other healthcare professionals and discussing with patient's family/caregivers. Chief Complaint: Abdominal pain    History of Present Illness:  Edie Ricardo is a very pleasant 37 y.o. female with a PMH of hypertension, hyperlipidemia, GERD, ISABELLE,  hypothyroidism, insomnia, interstitial cystitis followed by Dr. Velia Wright who presents with severe left lower quadrant abdominal pain affecting ability to walk. 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 Presented to the ED where she underwent CT abdomen pelvis renal protocol revealing no kidney stones then CT abdomen pelvis with contrast revealing no acute abnormality. Lactic acid was 3.9 and she was tachycardic with leukocytosis improving after fluid resuscitation and pain control. Presented to the medical service for further ration and treatment of intractable abdominal pain. At time of admission patient is pain-free and very happy. She reports urology intervention for ureteral stone in her teenage years, but only 1 occurrence    Review of Systems:  Review of Systems   Constitutional:  Negative for chills and fever. HENT:  Negative for ear pain and sore throat. Eyes:  Negative for pain and visual disturbance. Respiratory:  Negative for cough and shortness of breath. Cardiovascular:  Negative for chest pain and palpitations. Gastrointestinal:  Positive for abdominal pain and nausea. Negative for vomiting. Genitourinary:  Positive for difficulty urinating, flank pain and hematuria. Negative for dysuria. Musculoskeletal:  Negative for arthralgias and back pain. Skin:  Negative for color change and rash. Neurological:  Negative for seizures and syncope. All other systems reviewed and are negative. Past Medical and Surgical History:   Past Medical History:   Diagnosis Date    Disease of thyroid gland     Diverticulosis     GERD (gastroesophageal reflux disease)     High cholesterol     Hypertension        Past Surgical History:   Procedure Laterality Date    COLONOSCOPY      LITHOTRIPSY         Meds/Allergies:  Prior to Admission medications    Medication Sig Start Date End Date Taking? Authorizing Provider   ascorbic acid (VITAMIN C) 1000 MG tablet Take 1,000 mg by mouth daily   Yes Historical Provider, MD   betamethasone dipropionate (DIPROSONE) 0.05 % cream Apply 1 application. topically 2 (two) times a day 8/29/23 8/28/24 Yes Historical Provider, MD   Calcium Polycarbophil (Fiber) 625 MG TABS Take 625 mg by mouth daily   Yes Historical Provider, MD   Cinnamon 500 MG capsule Take 500 mg by mouth 2 (two) times a day   Yes Historical Provider, MD   clotrimazole (LOTRIMIN) 1 % cream Apply 1 application.  topically 2 (two) times a day 8/29/23 8/28/24 Yes Historical Provider, MD   cyanocobalamin (VITAMIN B-12) 100 MCG tablet Take by mouth   Yes Historical Provider, MD   dexlansoprazole (DEXILANT) 60 MG capsule Take 60 mg by mouth daily 3/18/22 12/2/23 Yes Historical Provider, MD   ferrous sulfate 325 (65 Fe) mg tablet Take by mouth   Yes Historical Provider, MD   levothyroxine 25 mcg tablet    Yes Historical Provider, MD   lisinopril (ZESTRIL) 10 mg tablet Take 20 mg by mouth daily   Yes Historical Provider, MD   Multiple Vitamin (Multi-Vitamin) tablet Take 1 tablet by mouth daily   Yes Historical Provider, MD   nystatin (MYCOSTATIN) powder Apply topically 2 (two) times a day 8/28/23 8/27/24 Yes Historical Provider, MD   simvastatin (ZOCOR) 40 mg tablet Take by mouth 1/19/22  Yes Historical Provider, MD   zolpidem (AMBIEN) 10 mg tablet Take 10 mg by mouth daily as needed 10/10/23 1/8/24 Yes Historical Provider, MD     I have reviewed home medications with patient personally. Allergies: Allergies   Allergen Reactions    Amoxicillin Rash, Facial Swelling and Swelling     Other reaction(s): Swelling  Lips  Lips      Other Other (See Comments)       Social History:  Marital Status: /Civil Union   Occupation: registrar at [employer]  Patient Pre-hospital Living Situation: With spouse  Patient Pre-hospital Level of Mobility: walks  Patient Pre-hospital Diet Restrictions: none  Substance Use History:   Social History     Substance and Sexual Activity   Alcohol Use Yes     Social History     Tobacco Use   Smoking Status Never   Smokeless Tobacco Never     Social History     Substance and Sexual Activity   Drug Use Not Currently       Family History:  History reviewed. No pertinent family history. Physical Exam:     Vitals:   Blood Pressure: 130/86 (12/02/23 1832)  Pulse: 94 (12/02/23 1832)  Temperature: 97.9 °F (36.6 °C) (12/02/23 1832)  Temp Source: Temporal (12/02/23 1243)  Respirations: 16 (12/02/23 1832)  SpO2: 94 % (12/02/23 1832)    Physical Exam  Vitals and nursing note reviewed. Constitutional:       General: She is not in acute distress. Appearance: Normal appearance. She is well-developed. She is obese. HENT:      Head: Normocephalic and atraumatic. Mouth/Throat:      Mouth: Mucous membranes are moist.   Eyes:      Conjunctiva/sclera: Conjunctivae normal.   Cardiovascular:      Rate and Rhythm: Normal rate and regular rhythm. Heart sounds: No murmur heard. Pulmonary:      Effort: Pulmonary effort is normal. No respiratory distress. Breath sounds: Normal breath sounds. Abdominal:      Palpations: Abdomen is soft. Tenderness: There is no abdominal tenderness. Musculoskeletal:         General: No swelling. Cervical back: Neck supple. Skin:     General: Skin is warm and dry. Capillary Refill: Capillary refill takes less than 2 seconds. Neurological:      General: No focal deficit present. Mental Status: She is alert and oriented to person, place, and time. Psychiatric:         Mood and Affect: Mood normal.         Behavior: Behavior normal.         Additional Data:     Lab Results:  Results from last 7 days   Lab Units 12/02/23  1311   WBC Thousand/uL 12.54*   HEMOGLOBIN g/dL 14.6   HEMATOCRIT % 44.0   PLATELETS Thousands/uL 448*   NEUTROS PCT % 50   LYMPHS PCT % 40   MONOS PCT % 7   EOS PCT % 1     Results from last 7 days   Lab Units 12/02/23  1311   SODIUM mmol/L 136   POTASSIUM mmol/L 3.1*   CHLORIDE mmol/L 101   CO2 mmol/L 21   BUN mg/dL 11   CREATININE mg/dL 0.72   ANION GAP mmol/L 14   CALCIUM mg/dL 9.7   ALBUMIN g/dL 4.6   TOTAL BILIRUBIN mg/dL 0.56   ALK PHOS U/L 66   ALT U/L 23   AST U/L 22   GLUCOSE RANDOM mg/dL 106                 Results from last 7 days   Lab Units 12/02/23  1727 12/02/23  1311   LACTIC ACID mmol/L 1.0 3.9*       Lines/Drains:  Invasive Devices       Peripheral Intravenous Line  Duration             Peripheral IV 12/02/23 Left;Proximal;Ventral (anterior) Forearm <1 day                        Imaging: Reviewed radiology reports from this admission including: abdominal/pelvic CT  CT abdomen pelvis with contrast   Final Result by [address] [place] [address],  (12/02 1725)      No acute intra-abdominal abnormality. No free air, free fluid, mesenteric inflammatory process, or bowel wall thickening. Workstation performed: [id number]         CT renal stone study abdomen pelvis wo contrast   Final Result by Sebastian Barajas MD (12/02 1443)      No acute pathology. Specifically, no urinary tract calculi or obstructive uropathy.             Workstation performed: [id number]             ** Please Note: This note has been constructed using a voice recognition system.  **

## 2023-12-03 NOTE — PLAN OF CARE
Problem: PAIN - ADULT  Goal: Verbalizes/displays adequate comfort level or baseline comfort level  Description: Interventions:  - Encourage patient to monitor pain and request assistance  - Assess pain using appropriate pain scale  - Administer analgesics based on type and severity of pain and evaluate response  - Implement non-pharmacological measures as appropriate and evaluate response  - Consider cultural and social influences on pain and pain management  - Notify physician/advanced practitioner if interventions unsuccessful or patient reports new pain  12/3/2023 0835 by Ashish Biswas RN  Outcome: Progressing  12/3/2023 0835 by Ashish Biswas RN  Outcome: Progressing     Problem: DISCHARGE PLANNING  Goal: Discharge to home or other facility with appropriate resources  Description: INTERVENTIONS:  - Identify barriers to discharge w/patient and caregiver  - Arrange for needed discharge resources and transportation as appropriate  - Identify discharge learning needs (meds, wound care, etc.)  - Arrange for interpretive services to assist at discharge as needed  - Refer to Case Management Department for coordinating discharge planning if the patient needs post-hospital services based on physician/advanced practitioner order or complex needs related to functional status, cognitive ability, or social support system  12/3/2023 0835 by Ashish Biswas RN  Outcome: Progressing  12/3/2023 0835 by Ashish Biswas RN  Outcome: Progressing

## 2023-12-03 NOTE — ASSESSMENT & PLAN NOTE
Presented to ED with left flank pain that began Thursday night and improved on Friday but then developed pain radiating from left flank to left abdomen with hematuria, urinary pressure and on Saturday afternoon pain was so severe she was barely able to walk  UA negative pyuria but positive hematuria/bacteriuria/epithelial cells  CT abdomen pelvis without contrast negative for stones, CT abdomen pelvis with contrast no acute abnormality  Pain controlled with Toradol and IV fentanyl  Admitted to medical service for observation, serial abdominal exams, repeat labs in the a.m. Pain-free at time of admission  Suspect probable passed left ureteral stone  Pain has improved abdomen is nontender no urinary symptoms UA does look abnormal therefore we will send in cefdinir 300 mg p.o. twice daily for 5 more days she has tolerated Rocephin without any issues or adverse effects and follow-up urine culture.

## 2023-12-03 NOTE — ASSESSMENT & PLAN NOTE
Met criteria with tachycardia, leukocytosis, lactic acid 3.9  Lactic acid resolved to 1 after fluid resuscitation  UA negative pyuria  Suspect inflammatory secondary to passed ureteral stone

## 2023-12-03 NOTE — ASSESSMENT & PLAN NOTE
Presented to ED with left flank pain that began Thursday night and improved on Friday but then developed pain radiating from left flank to left abdomen with hematuria, urinary pressure and on Saturday afternoon pain was so severe she was barely able to walk  UA negative pyuria but positive hematuria/bacteriuria/epithelial cells  CT abdomen pelvis without contrast negative for stones, CT abdomen pelvis with contrast no acute abnormality  Pain controlled with Toradol and IV fentanyl  Admitted to medical service for observation, serial abdominal exams, repeat labs in the a.m.   Pain-free at time of admission  Suspect probable passed left ureteral stone  Strain all urine

## 2023-12-03 NOTE — PLAN OF CARE
Problem: PAIN - ADULT  Goal: Verbalizes/displays adequate comfort level or baseline comfort level  Description: Interventions:  - Encourage patient to monitor pain and request assistance  - Assess pain using appropriate pain scale  - Administer analgesics based on type and severity of pain and evaluate response  - Implement non-pharmacological measures as appropriate and evaluate response  - Consider cultural and social influences on pain and pain management  - Notify physician/advanced practitioner if interventions unsuccessful or patient reports new pain  Outcome: Progressing     Problem: INFECTION - ADULT  Goal: Absence or prevention of progression during hospitalization  Description: INTERVENTIONS:  - Assess and monitor for signs and symptoms of infection  - Monitor lab/diagnostic results  - Monitor all insertion sites, i.e. indwelling lines, tubes, and drains  - Monitor endotracheal if appropriate and nasal secretions for changes in amount and color  - [place] appropriate cooling/warming therapies per order  - Administer medications as ordered  - Instruct and encourage patient and family to use good hand hygiene technique  - Identify and instruct in appropriate isolation precautions for identified infection/condition  Outcome: Progressing  Goal: Absence of fever/infection during neutropenic period  Description: INTERVENTIONS:  - Monitor WBC    Outcome: Progressing     Problem: SAFETY ADULT  Goal: Patient will remain free of falls  Description: INTERVENTIONS:  - Educate patient/family on patient safety including physical limitations  - Instruct patient to call for assistance with activity   - Consult OT/PT to assist with strengthening/mobility   - Keep Call bell within reach  - Keep bed low and locked with side rails adjusted as appropriate  - Keep care items and personal belongings within reach  - Initiate and maintain comfort rounds  - Make Fall Risk Sign visible to staff  - Offer Toileting every 2 Hours, in advance of need  - Apply yellow socks and bracelet for high fall risk patients  - Consider moving patient to room near nurses station  Outcome: Progressing  Goal: Maintain or return to baseline ADL function  Description: INTERVENTIONS:  -  Assess patient's ability to carry out ADLs; assess patient's baseline for ADL function and identify physical deficits which impact ability to perform ADLs (bathing, care of mouth/teeth, toileting, grooming, dressing, etc.)  - Assess/evaluate cause of self-care deficits   - Assess range of motion  - Assess patient's mobility; develop plan if impaired  - Assess patient's need for assistive devices and provide as appropriate  - Encourage maximum independence but intervene and supervise when necessary  - Involve family in performance of ADLs  - Assess for home care needs following discharge   - Consider OT consult to assist with ADL evaluation and planning for discharge  - Provide patient education as appropriate  Outcome: Progressing  Goal: Maintains/Returns to pre admission functional level  Description: INTERVENTIONS:  - Perform AM-PAC 6 Click Basic Mobility/ Daily Activity assessment daily.  - Set and communicate daily mobility goal to care team and patient/family/caregiver. - Collaborate with rehabilitation services on mobility goals if consulted  - Perform Range of Motion 3 times a day. - Reposition patient every 2 hours.   - Dangle patient 3 times a day  - Stand patient 3 times a day  - Ambulate patient 3 times a day  - Out of bed to chair 3 times a day   - Out of bed for meals 3 times a day  - Out of bed for toileting  - Record patient progress and toleration of activity level   Outcome: Progressing     Problem: DISCHARGE PLANNING  Goal: Discharge to home or other facility with appropriate resources  Description: INTERVENTIONS:  - Identify barriers to discharge w/patient and caregiver  - Arrange for needed discharge resources and transportation as appropriate  - Identify discharge learning needs (meds, wound care, etc.)  - Arrange for interpretive services to assist at discharge as needed  - Refer to Case Management Department for coordinating discharge planning if the patient needs post-hospital services based on physician/advanced practitioner order or complex needs related to functional status, cognitive ability, or social support system  Outcome: Progressing     Problem: Knowledge Deficit  Goal: Patient/family/caregiver demonstrates understanding of disease process, treatment plan, medications, and discharge instructions  Description: Complete learning assessment and assess knowledge base.   Interventions:  - Provide teaching at level of understanding  - Provide teaching via preferred learning methods  Outcome: Progressing     Problem: GENITOURINARY - ADULT  Goal: Maintains or returns to baseline urinary function  Description: INTERVENTIONS:  - Assess urinary function  - Encourage oral fluids to ensure adequate hydration if ordered  - Administer IV fluids as ordered to ensure adequate hydration  - Administer ordered medications as needed  - Offer frequent toileting  - Follow urinary retention protocol if ordered  Outcome: Progressing  Goal: Absence of urinary retention  Description: INTERVENTIONS:  - Assess patient’s ability to void and empty bladder  - Monitor I/O  - Bladder scan as needed  - Discuss with physician/AP medications to alleviate retention as needed  - Discuss catheterization for long term situations as appropriate  Outcome: Progressing

## 2023-12-03 NOTE — DISCHARGE SUMMARY
[address] [facility]  Discharge- [address] 1980, 37 y.o. female MRN: [id number]  Unit/Bed#: -01 Encounter: [id number]  Primary Care Provider: Jus Roque MD   Date and time admitted to hospital: 12/2/2023 12:40 PM    * Left lower quadrant abdominal pain  Assessment & Plan  Presented to ED with left flank pain that began Thursday night and improved on Friday but then developed pain radiating from left flank to left abdomen with hematuria, urinary pressure and on Saturday afternoon pain was so severe she was barely able to walk  UA negative pyuria but positive hematuria/bacteriuria/epithelial cells  CT abdomen pelvis without contrast negative for stones, CT abdomen pelvis with contrast no acute abnormality  Pain controlled with Toradol and IV fentanyl  Admitted to medical service for observation, serial abdominal exams, repeat labs in the a.m. Pain-free at time of admission  Suspect probable passed left ureteral stone  Pain has improved abdomen is nontender no urinary symptoms UA does look abnormal therefore we will send in cefdinir 300 mg p.o. twice daily for 5 more days she has tolerated Rocephin without any issues or adverse effects and follow-up urine culture.     Hypokalemia  Assessment & Plan  Potassium 3.1  Repleted and rechecked magnesium and potassium is normal    Primary hypertension  Assessment & Plan  Continue lisinopril 20 mg daily  Trend blood pressure    Acquired hypothyroidism  Assessment & Plan  Continue levothyroxine 25 mcg daily  Outpatient follow-up    Severe systemic inflammatory response syndrome (SIRS) (HCC)  Assessment & Plan  Met criteria with tachycardia, leukocytosis, lactic acid 3.9  Lactic acid resolved to 1 after fluid resuscitation  UA negative pyuria  Suspect inflammatory secondary to passed ureteral stone resolved          Medical Problems       Resolved Problems  Date Reviewed: 12/3/2023   None       Discharging Physician / Practitioner: Shilpi Fleming MD  PCP: Tracey Randolph MD  Admission Date:   Admission Orders (From admission, onward)       Ordered        12/02/23 1807  INPATIENT ADMISSION  Once                          Discharge Date: 12/03/23    Consultations During Hospital Stay:  none    Procedures Performed:   none    Significant Findings / Test Results:   CT abdomen and pelvis- No acute intra-abdominal abnormality. No free air, free fluid, mesenteric inflammatory process, or bowel wall thickening. Incidental Findings:   none    Test Results Pending at Discharge (will require follow up):   none     Outpatient Tests Requested:  none    Complications:  none    Reason for Admission: Left lower quadrant abdominal pain    Hospital Course:   Kayleen Holliday is a 37 y.o. female patient who originally presented to the hospital on 12/2/2023 due to left flank pain with hematuria or urinary pressure suspected she has passed the stone as otherwise no abnormality found on a CT abdomen pelvis she was admitted for observation UA was positive for bacteriuria but also presence of epithelial cells secondary to passing a stone decision to treat with antibiotics patient pain has significantly improved vitals are stable labs are stable will discharge to cefdinir for 5 more days. Follow-up urine culture outpatient. Please see above list of diagnoses and related plan for additional information.      Condition at Discharge: stable    Discharge Day Visit / Exam:   Subjective: Patient seen and examined denies any urinary symptoms feeling much better left side only tiny pressure but abdominal pain is significantly improved and she wants to shower  Vitals: Blood Pressure: 121/80 (12/03/23 0717)  Pulse: 80 (12/03/23 0717)  Temperature: (!) 97.2 °F (36.2 °C) (12/03/23 0717)  Temp Source: Temporal (12/03/23 0717)  Respirations: 18 (12/03/23 0717)  Height: 5' 2" (157.5 cm) (12/02/23 1937)  Weight - Scale: 92.1 kg (203 lb) (12/02/23 1937)  SpO2: 94 % (12/03/23 1901)  Exam:   Physical Exam  Vitals and nursing note reviewed. Constitutional:       General: She is not in acute distress. Appearance: She is well-developed. HENT:      Head: Normocephalic and atraumatic. Eyes:      Conjunctiva/sclera: Conjunctivae normal.   Cardiovascular:      Rate and Rhythm: Normal rate and regular rhythm. Heart sounds: No murmur heard. Pulmonary:      Effort: Pulmonary effort is normal. No respiratory distress. Breath sounds: Normal breath sounds. No wheezing. Abdominal:      General: There is no distension. Palpations: Abdomen is soft. Tenderness: There is no abdominal tenderness. There is no right CVA tenderness or left CVA tenderness. Musculoskeletal:         General: No swelling. Cervical back: Neck supple. Skin:     General: Skin is warm and dry. Capillary Refill: Capillary refill takes less than 2 seconds. Neurological:      Mental Status: She is alert and oriented to person, place, and time. Psychiatric:         Mood and Affect: Mood normal.          Discussion with Family: Patient declined call to . Discharge instructions/Information to patient and family:   See after visit summary for information provided to patient and family. Provisions for Follow-Up Care:  See after visit summary for information related to follow-up care and any pertinent home health orders. Mobility at time of Discharge:   Basic Mobility Inpatient Raw Score: 24  JH-HLM Goal: 8: Walk 250 feet or more  JH-HLM Achieved: 8: Walk 250 feet ot more  HLM Goal achieved. Continue to encourage appropriate mobility. Disposition:   Home    Planned Readmission: no     Discharge Statement:  I spent >35 minutes discharging the patient. This time was spent on the day of discharge. I had direct contact with the patient on the day of discharge.  Greater than 50% of the total time was spent examining patient, answering all patient questions, arranging and discussing plan of care with patient as well as directly providing post-discharge instructions. Additional time then spent on discharge activities. Discharge Medications:  See after visit summary for reconciled discharge medications provided to patient and/or family.       **Please Note: This note may have been constructed using a voice recognition system**

## 2023-12-03 NOTE — NURSING NOTE
Patient discharged home today; IV removed intact, no bleeding noted; AVS printed and reviewed with patient, patient verbalized understanding; pt ambulates to exit per her request accompanied by spouse and this RN.

## 2023-12-04 LAB — BACTERIA UR CULT: NORMAL

## 2023-12-04 NOTE — UTILIZATION REVIEW
NOTIFICATION OF ADMISSION DISCHARGE   This is a Notification of Discharge from [address] [facility]. Please be advised that this patient has been discharge from our facility. Below you will find the admission and discharge date and time including the patient’s disposition. UTILIZATION REVIEW CONTACT:  Phil Montague  Utilization   Network Utilization Review Department  Phone: [phone] x carefully listen to the prompts. All voicemails are confidential.  Email: [email or web address]     ADMISSION INFORMATION  PRESENTATION DATE: 12/2/2023 12:40 PM  OBERVATION ADMISSION DATE:   INPATIENT ADMISSION DATE: 12/2/23  6:07 PM   DISCHARGE DATE: 12/3/2023 11:38 AM   DISPOSITION:Home/Self Care    Network Utilization Review Department  ATTENTION: Please call with any questions or concerns to [phone] and carefully listen to the prompts so that you are directed to the right person. All voicemails are confidential.   For Discharge needs, contact Care Management DC Support Team at [phone] opt. 2  Send all requests for admission clinical reviews, approved or denied determinations and any other requests to dedicated fax number below belonging to the campus where the patient is receiving treatment.  List of dedicated fax numbers for the Facilities:  [place] DENIALS (Administrative/Medical Necessity) [phone]   DISCHARGE SUPPORT TEAM (Network) [phone] [facility] [phone]   [address] [facility] [address] [place] [address] [address] [phone]

## 2023-12-04 NOTE — UTILIZATION REVIEW
NOTIFICATION OF INPATIENT ADMISSION   AUTHORIZATION REQUEST   SERVICING FACILITY:   [address]  Tax ID: [id number]  NPI: [id number] ATTENDING PROVIDER:  Attending Name and NPI#: Royal Satinder Md [[id number]]  Address: [address] [place] [address]  Phone: [phone]   ADMISSION INFORMATION:  Place of Service: [address] [place] [address] Code: 21  Inpatient Admission Date/Time: 12/2/23  6:07 PM  Discharge Date/Time: 12/3/2023 11:38 AM  Admitting Diagnosis Code/Description:  Leukocytosis [D72.829]  Groin pain [R10.30]  Left flank pain [R10.9]  Uncontrolled pain [R52]     UTILIZATION REVIEW CONTACT:  Michell Reece, Utilization   Network Utilization Review Department  Phone: [phone]  Fax [phone]  Email: [email or web address]  Contact for approvals/pending authorizations, clinical reviews, and discharge. PHYSICIAN ADVISORY SERVICES:  Medical Necessity Denial & [facility] Review  Phone: [phone]  Fax: [phone]  Email: [email or web address]     DISCHARGE SUPPORT TEAM:  For Patients Discharge Needs & Updates  Phone: [phone] opt. 2 Fax: [phone]  Email: [email or web address]

## 2024-02-24 ENCOUNTER — APPOINTMENT (EMERGENCY)
Dept: CT IMAGING | Facility: HOSPITAL | Age: 44
End: 2024-02-24
Payer: COMMERCIAL

## 2024-02-24 ENCOUNTER — HOSPITAL ENCOUNTER (EMERGENCY)
Facility: HOSPITAL | Age: 44
Discharge: HOME | End: 2024-02-24
Attending: EMERGENCY MEDICINE
Payer: COMMERCIAL

## 2024-02-24 VITALS
TEMPERATURE: 98.2 F | SYSTOLIC BLOOD PRESSURE: 154 MMHG | DIASTOLIC BLOOD PRESSURE: 80 MMHG | HEIGHT: 62 IN | OXYGEN SATURATION: 97 % | WEIGHT: 200 LBS | BODY MASS INDEX: 36.8 KG/M2 | RESPIRATION RATE: 20 BRPM | HEART RATE: 105 BPM

## 2024-02-24 DIAGNOSIS — R10.31 RIGHT LOWER QUADRANT ABDOMINAL PAIN: Primary | ICD-10-CM

## 2024-02-24 LAB
ALBUMIN SERPL BCP-MCNC: 4.3 G/DL (ref 3.5–5)
ALP SERPL-CCNC: 61 U/L (ref 34–104)
ALT SERPL W P-5'-P-CCNC: 19 U/L (ref 7–52)
ANION GAP SERPL CALCULATED.3IONS-SCNC: 5 MMOL/L
AST SERPL W P-5'-P-CCNC: 30 U/L (ref 13–39)
BASOPHILS # BLD AUTO: 0.04 THOUSANDS/ÂΜL (ref 0–0.1)
BASOPHILS NFR BLD AUTO: 1 % (ref 0–1)
BILIRUB SERPL-MCNC: 0.54 MG/DL (ref 0.2–1)
BILIRUB UR QL STRIP: NEGATIVE
BUN SERPL-MCNC: 13 MG/DL (ref 5–25)
CALCIUM SERPL-MCNC: 9.3 MG/DL (ref 8.4–10.2)
CHLORIDE SERPL-SCNC: 104 MMOL/L (ref 96–108)
CLARITY UR: CLEAR
CO2 SERPL-SCNC: 26 MMOL/L (ref 21–32)
COLOR UR: YELLOW
CREAT SERPL-MCNC: 0.71 MG/DL (ref 0.6–1.3)
EOSINOPHIL # BLD AUTO: 0.09 THOUSAND/ÂΜL (ref 0–0.61)
EOSINOPHIL NFR BLD AUTO: 1 % (ref 0–6)
ERYTHROCYTE [DISTWIDTH] IN BLOOD BY AUTOMATED COUNT: 12.7 % (ref 11.6–15.1)
EXT PREGNANCY TEST URINE: NEGATIVE
EXT. CONTROL: NORMAL
GFR SERPL CREATININE-BSD FRML MDRD: 102 ML/MIN/1.73SQ M
GLUCOSE SERPL-MCNC: 89 MG/DL (ref 65–140)
GLUCOSE UR STRIP-MCNC: NEGATIVE MG/DL
HCT VFR BLD AUTO: 39.8 % (ref 34.8–46.1)
HGB BLD-MCNC: 13.4 G/DL (ref 11.5–15.4)
HGB UR QL STRIP.AUTO: NEGATIVE
IMM GRANULOCYTES # BLD AUTO: 0.05 THOUSAND/UL (ref 0–0.2)
IMM GRANULOCYTES NFR BLD AUTO: 1 % (ref 0–2)
KETONES UR STRIP-MCNC: ABNORMAL MG/DL
LEUKOCYTE ESTERASE UR QL STRIP: NEGATIVE
LYMPHOCYTES # BLD AUTO: 2.29 THOUSANDS/ÂΜL (ref 0.6–4.47)
LYMPHOCYTES NFR BLD AUTO: 29 % (ref 14–44)
MCH RBC QN AUTO: 29.6 PG (ref 26.8–34.3)
MCHC RBC AUTO-ENTMCNC: 33.7 G/DL (ref 31.4–37.4)
MCV RBC AUTO: 88 FL (ref 82–98)
MONOCYTES # BLD AUTO: 0.63 THOUSAND/ÂΜL (ref 0.17–1.22)
MONOCYTES NFR BLD AUTO: 8 % (ref 4–12)
NEUTROPHILS # BLD AUTO: 4.89 THOUSANDS/ÂΜL (ref 1.85–7.62)
NEUTS SEG NFR BLD AUTO: 60 % (ref 43–75)
NITRITE UR QL STRIP: NEGATIVE
NRBC BLD AUTO-RTO: 0 /100 WBCS
PH UR STRIP.AUTO: 6.5 [PH]
PLATELET # BLD AUTO: 260 THOUSANDS/UL (ref 149–390)
PMV BLD AUTO: 10.1 FL (ref 8.9–12.7)
POTASSIUM SERPL-SCNC: 4.3 MMOL/L (ref 3.5–5.3)
PROT SERPL-MCNC: 7.2 G/DL (ref 6.4–8.4)
PROT UR STRIP-MCNC: NEGATIVE MG/DL
RBC # BLD AUTO: 4.53 MILLION/UL (ref 3.81–5.12)
SODIUM SERPL-SCNC: 135 MMOL/L (ref 135–147)
SP GR UR STRIP.AUTO: 1.02 (ref 1–1.03)
UROBILINOGEN UR QL STRIP.AUTO: 1 E.U./DL
WBC # BLD AUTO: 7.99 THOUSAND/UL (ref 4.31–10.16)

## 2024-02-24 PROCEDURE — 99284 EMERGENCY DEPT VISIT MOD MDM: CPT | Performed by: EMERGENCY MEDICINE

## 2024-02-24 PROCEDURE — 36415 COLL VENOUS BLD VENIPUNCTURE: CPT | Performed by: EMERGENCY MEDICINE

## 2024-02-24 PROCEDURE — 99284 EMERGENCY DEPT VISIT MOD MDM: CPT

## 2024-02-24 PROCEDURE — 80053 COMPREHEN METABOLIC PANEL: CPT | Performed by: EMERGENCY MEDICINE

## 2024-02-24 PROCEDURE — 96374 THER/PROPH/DIAG INJ IV PUSH: CPT

## 2024-02-24 PROCEDURE — 74177 CT ABD & PELVIS W/CONTRAST: CPT

## 2024-02-24 PROCEDURE — 81025 URINE PREGNANCY TEST: CPT | Performed by: EMERGENCY MEDICINE

## 2024-02-24 PROCEDURE — 81003 URINALYSIS AUTO W/O SCOPE: CPT | Performed by: EMERGENCY MEDICINE

## 2024-02-24 PROCEDURE — 85025 COMPLETE CBC W/AUTO DIFF WBC: CPT | Performed by: EMERGENCY MEDICINE

## 2024-02-24 PROCEDURE — 96375 TX/PRO/DX INJ NEW DRUG ADDON: CPT

## 2024-02-24 RX ORDER — MORPHINE SULFATE 4 MG/ML
4 INJECTION, SOLUTION INTRAMUSCULAR; INTRAVENOUS ONCE
Status: COMPLETED | OUTPATIENT
Start: 2024-02-24 | End: 2024-02-24

## 2024-02-24 RX ORDER — KETOROLAC TROMETHAMINE 30 MG/ML
15 INJECTION, SOLUTION INTRAMUSCULAR; INTRAVENOUS ONCE
Status: COMPLETED | OUTPATIENT
Start: 2024-02-24 | End: 2024-02-24

## 2024-02-24 RX ADMIN — MORPHINE SULFATE 4 MG: 4 INJECTION, SOLUTION INTRAMUSCULAR; INTRAVENOUS at 13:45

## 2024-02-24 RX ADMIN — KETOROLAC TROMETHAMINE 15 MG: 30 INJECTION, SOLUTION INTRAMUSCULAR; INTRAVENOUS at 12:35

## 2024-02-24 RX ADMIN — IOHEXOL 100 ML: 350 INJECTION, SOLUTION INTRAVENOUS at 13:29

## 2024-02-24 NOTE — DISCHARGE INSTRUCTIONS
Return immediately if worse or any new symptoms  Tylenol 1000 mg every 6 hours as needed  and/or  Advil 400 mg every 6 hours as needed  May take both together  Please follow-up with gynecologist as scheduled this week

## 2024-02-24 NOTE — ED PROVIDER NOTES
History  Chief Complaint   Patient presents with    Abdominal Pain     Pt having RLQ pain, pt reports some nausea with it, seen at family doctor for the same and had outpatient labs and US, was not given a definitive answer on pain but states it is getting worse, also states they are roughly over two weeks late for period      44-year-old female describes abdominal bloating over the past 2 weeks, missed period about 2.5 weeks ago, right lower quadrant discomfort worse over the past 3 days with urinary frequency.  Recently had ultrasound urine and blood work evaluated by PMD and GYN appointment this week.  Notes that the similar abdominal discomfort about 2 months ago with evaluation at [facility] emergency department.  Notes no penis-vagina intercourse in over 10 months      History provided by:  Patient  Abdominal Pain  Pain location:  RLQ  Pain quality: dull    Pain radiates to:  Does not radiate  Pain severity:  Moderate  Onset quality:  Gradual  Timing:  Constant  Progression:  Worsening  Chronicity:  New  Context: not trauma    Relieved by:  None tried  Worsened by:  Palpation  Ineffective treatments:  None tried  Associated symptoms: no fever    Risk factors: not pregnant and no recent hospitalization        Prior to Admission Medications   Prescriptions Last Dose Informant Patient Reported? Taking?   Calcium Polycarbophil (Fiber) 625 MG TABS   Yes No   Sig: Take 625 mg by mouth daily   Cinnamon 500 MG capsule   Yes No   Sig: Take 500 mg by mouth 2 (two) times a day   Multiple Vitamin (Multi-Vitamin) tablet   Yes No   Sig: Take 1 tablet by mouth daily   ascorbic acid (VITAMIN C) 1000 MG tablet   Yes No   Sig: Take 1,000 mg by mouth daily   betamethasone dipropionate (DIPROSONE) 0.05 % cream   Yes No   Sig: Apply 1 application. topically 2 (two) times a day   clotrimazole (LOTRIMIN) 1 % cream   Yes No   Sig: Apply 1 application. topically 2 (two) times a day   cyanocobalamin (VITAMIN B-12) 100 MCG tablet   Yes No    Sig: Take by mouth   dexlansoprazole (DEXILANT) 60 MG capsule   Yes No   Sig: Take 60 mg by mouth daily   ferrous sulfate 325 (65 Fe) mg tablet   Yes No   Sig: Take by mouth   levothyroxine 25 mcg tablet   Yes No   lisinopril (ZESTRIL) 10 mg tablet   Yes No   Sig: Take 20 mg by mouth daily   nystatin (MYCOSTATIN) powder   Yes No   Sig: Apply topically 2 (two) times a day   simvastatin (ZOCOR) 40 mg tablet   Yes No   Sig: Take by mouth   zolpidem (AMBIEN) 10 mg tablet   Yes No   Sig: Take 10 mg by mouth daily as needed      Facility-Administered Medications: None       Past Medical History:   Diagnosis Date    Disease of thyroid gland     Diverticulosis     GERD (gastroesophageal reflux disease)     High cholesterol     Hypertension        Past Surgical History:   Procedure Laterality Date    COLONOSCOPY      LITHOTRIPSY         History reviewed. No pertinent family history.  I have reviewed and agree with the history as documented.    E-Cigarette/Vaping    E-Cigarette Use Never User      E-Cigarette/Vaping Substances     Social History     Tobacco Use    Smoking status: Never    Smokeless tobacco: Never   Vaping Use    Vaping status: Never Used   Substance Use Topics    Alcohol use: Yes    Drug use: Not Currently       Review of Systems   Constitutional:  Negative for fever.   Gastrointestinal:  Positive for abdominal pain.   All other systems reviewed and are negative.      Physical Exam  Physical Exam  Vitals and nursing note reviewed.   Constitutional:       Comments: Pleasant, comfortable-appearing   HENT:      Head: Normocephalic and atraumatic.      Mouth/Throat:      Mouth: Mucous membranes are moist.      Pharynx: Oropharynx is clear.   Eyes:      Conjunctiva/sclera: Conjunctivae normal.      Pupils: Pupils are equal, round, and reactive to light.   Cardiovascular:      Rate and Rhythm: Normal rate and regular rhythm.      Heart sounds: Normal heart sounds.   Pulmonary:      Effort: Pulmonary effort is  normal.      Breath sounds: Normal breath sounds.   Abdominal:      General: Bowel sounds are normal. There is no distension.      Palpations: Abdomen is soft.      Tenderness: There is abdominal tenderness in the right lower quadrant. There is no right CVA tenderness, left CVA tenderness, guarding or rebound.   Musculoskeletal:         General: No deformity.      Cervical back: Neck supple.   Skin:     General: Skin is warm and dry.   Neurological:      General: No focal deficit present.      Mental Status: She is alert and oriented to person, place, and time.      Cranial Nerves: No cranial nerve deficit.      Coordination: Coordination normal.   Psychiatric:         Behavior: Behavior normal.         Thought Content: Thought content normal.         Judgment: Judgment normal.         Vital Signs  ED Triage Vitals [02/24/24 1153]   Temperature Pulse Respirations Blood Pressure SpO2   98.2 °F (36.8 °C) 105 20 154/80 97 %      Temp Source Heart Rate Source Patient Position - Orthostatic VS BP Location FiO2 (%)   Temporal Monitor -- -- --      Pain Score       7           Vitals:    02/24/24 1153   BP: 154/80   Pulse: 105         Visual Acuity      ED Medications  Medications   ketorolac (TORADOL) injection 15 mg (15 mg Intravenous Given 2/24/24 1235)   morphine injection 4 mg (4 mg Intravenous Given 2/24/24 1345)   iohexol (OMNIPAQUE) 350 MG/ML injection (MULTI-DOSE) 100 mL (100 mL Intravenous Given 2/24/24 1329)       Diagnostic Studies  Results Reviewed       Procedure Component Value Units Date/Time    Comprehensive metabolic panel [[id number]] Collected: 02/24/24 1230    Lab Status: Final result Specimen: Blood from Arm, Right Updated: 02/24/24 1302     Sodium 135 mmol/L      Potassium 4.3 mmol/L      Chloride 104 mmol/L      CO2 26 mmol/L      ANION GAP 5 mmol/L      BUN 13 mg/dL      Creatinine 0.71 mg/dL      Glucose 89 mg/dL      Calcium 9.3 mg/dL      AST 30 U/L      ALT 19 U/L      Alkaline Phosphatase 61  U/L      Total Protein 7.2 g/dL      Albumin 4.3 g/dL      Total Bilirubin 0.54 mg/dL      eGFR 102 ml/min/1.73sq m     Narrative:      National Kid[employer] Disease Foundation guidelines for Chronic Kidney Disease (CKD):     Stage 1 with normal or high GFR (GFR > 90 mL/min/1.73 square meters)    Stage 2 Mild CKD (GFR = 60-89 mL/min/1.73 square meters)    Stage 3A Moderate CKD (GFR = 45-59 mL/min/1.73 square meters)    Stage 3B Moderate CKD (GFR = 30-44 mL/min/1.73 square meters)    Stage 4 Severe CKD (GFR = 15-29 mL/min/1.73 square meters)    Stage 5 End Stage CKD (GFR <15 mL/min/1.73 square meters)  Note: GFR calculation is accurate only with a steady state creatinine    CBC and differential [[id number]] Collected: 02/24/24 1230    Lab Status: Final result Specimen: Blood from Arm, Right Updated: 02/24/24 1244     WBC 7.99 Thousand/uL      RBC 4.53 Million/uL      Hemoglobin 13.4 g/dL      Hematocrit 39.8 %      MCV 88 fL      MCH 29.6 pg      MCHC 33.7 g/dL      RDW 12.7 %      MPV 10.1 fL      Platelets 260 Thousands/uL      nRBC 0 /100 WBCs      Neutrophils Relative 60 %      Immat GRANS % 1 %      Lymphocytes Relative 29 %      Monocytes Relative 8 %      Eosinophils Relative 1 %      Basophils Relative 1 %      Neutrophils Absolute 4.89 Thousands/µL      Immature Grans Absolute 0.05 Thousand/uL      Lymphocytes Absolute 2.29 Thousands/µL      Monocytes Absolute 0.63 Thousand/µL      Eosinophils Absolute 0.09 Thousand/µL      Basophils Absolute 0.04 Thousands/µL     UA (URINE) with reflex to Scope [[id number]]  (Abnormal) Collected: 02/24/24 1222    Lab Status: Final result Specimen: Urine, Clean Catch Updated: 02/24/24 1232     Color, UA Yellow     Clarity, UA Clear     Specific Gravity, UA 1.025     pH, UA 6.5     Leukocytes, UA Negative     Nitrite, UA Negative     Protein, UA Negative mg/dl      Glucose, UA Negative mg/dl      Ketones, UA Trace mg/dl      Urobilinogen, UA 1.0 E.U./dl      Bilirubin, UA Negative      Occult Blood, UA Negative    POCT pregnancy, urine [[id number]]  (Normal) Resulted: 02/24/24 1222    Lab Status: Final result Updated: 02/24/24 1223     EXT Preg Test, Ur Negative     Control Valid                   CT abdomen pelvis with contrast   Final Result by Louie Gant MD (02/24 1425)      No acute findings in the abdomen or pelvis to account for the patient's symptoms.         Workstation performed: [id number]                    Procedures  Procedures         ED Course  ED Course as of 02/25/24 1257   Sat Feb 24, 2024   1549 Comfort improved, discussed results and agreeable with close outpatient follow-up, spouse present and supportive                               SBIRT 22yo+      Flowsheet Row Most Recent Value   Initial Alcohol Screen: US AUDIT-C     1. How often do you have a drink containing alcohol? 0 Filed at: 02/24/2024 1152   2. How many drinks containing alcohol do you have on a typical day you are drinking?  0 Filed at: 02/24/2024 1152   3a. Male UNDER 65: How often do you have five or more drinks on one occasion? 0 Filed at: 02/24/2024 1152   3b. FEMALE Any Age, or MALE 65+: How often do you have 4 or more drinks on one occassion? 0 Filed at: 02/24/2024 1152   Audit-C Score 0 Filed at: 02/24/2024 1152   POOJA: How many times in the past year have you...    Used an illegal drug or used a prescription medication for non-medical reasons? Never Filed at: 02/24/2024 1152                      Medical Decision Making  Amount and/or Complexity of Data Reviewed  Labs: ordered.  Radiology: ordered.    Risk  Prescription drug management.             Disposition  Final diagnoses:   Right lower quadrant abdominal pain     Time reflects when diagnosis was documented in both MDM as applicable and the Disposition within this note       Time User Action Codes Description Comment    2/24/2024  2:58 PM Paulino Phelps Add [R10.31] Right lower quadrant abdominal pain           ED Disposition       ED  Disposition   Discharge    Condition   Stable    Date/Time   Sat Feb 24, 2024 1458    Comment   Darshana Luke discharge to home/self care.                   Follow-up Information       Follow up With Specialties Details Why Contact Info    Latha Keys MD Internal Medicine Schedule an appointment as soon as possible for a visit in 1 week  [address] [facility] [address]  [phone]              Discharge Medication List as of 2/24/2024  3:00 PM        CONTINUE these medications which have NOT CHANGED    Details   ascorbic acid (VITAMIN C) 1000 MG tablet Take 1,000 mg by mouth daily, Historical Med      betamethasone dipropionate (DIPROSONE) 0.05 % cream Apply 1 application. topically 2 (two) times a day, Starting Tue 8/29/2023, Until Wed 8/28/2024, Historical Med      Calcium Polycarbophil (Fiber) 625 MG TABS Take 625 mg by mouth daily, Historical Med      Cinnamon 500 MG capsule Take 500 mg by mouth 2 (two) times a day, Historical Med      clotrimazole (LOTRIMIN) 1 % cream Apply 1 application. topically 2 (two) times a day, Starting Tue 8/29/2023, Until Wed 8/28/2024, Historical Med      cyanocobalamin (VITAMIN B-12) 100 MCG tablet Take by mouth, Historical Med      dexlansoprazole (DEXILANT) 60 MG capsule Take 60 mg by mouth daily, Starting Fri 3/18/2022, Until Sat 12/2/2023, Historical Med      ferrous sulfate 325 (65 Fe) mg tablet Take by mouth, Historical Med      levothyroxine 25 mcg tablet Historical Med      lisinopril (ZESTRIL) 10 mg tablet Take 20 mg by mouth daily, Historical Med      Multiple Vitamin (Multi-Vitamin) tablet Take 1 tablet by mouth daily, Historical Med      nystatin (MYCOSTATIN) powder Apply topically 2 (two) times a day, Starting Mon 8/28/2023, Until Tue 8/27/2024, Historical Med      simvastatin (ZOCOR) 40 mg tablet Take by mouth, Starting Wed 1/19/2022, Historical Med      zolpidem (AMBIEN) 10 mg tablet Take 10 mg by mouth daily as needed, Starting Tue 10/10/2023,  Until Mon 1/8/2024 at 2359, Historical Med             No discharge procedures on file.    PDMP Review       None            ED Provider  Electronically Signed by             Paulino Phelps,   02/25/24 1256       Paulino Phelps DO  02/25/24 1257